# Patient Record
Sex: MALE | Race: WHITE | URBAN - METROPOLITAN AREA
[De-identification: names, ages, dates, MRNs, and addresses within clinical notes are randomized per-mention and may not be internally consistent; named-entity substitution may affect disease eponyms.]

---

## 2017-01-20 ENCOUNTER — Encounter (OUTPATIENT)
Dept: URBAN - METROPOLITAN AREA CLINIC 44 | Facility: CLINIC | Age: 72
End: 2017-01-20
Payer: MEDICARE

## 2017-01-20 DIAGNOSIS — Z01.818 ENCOUNTER FOR OTHER PREPROCEDURAL EXAMINATION: Primary | ICD-10-CM

## 2017-01-20 PROCEDURE — 99213 OFFICE O/P EST LOW 20 MIN: CPT | Performed by: PHYSICIAN ASSISTANT

## 2017-02-03 ENCOUNTER — SURGERY (OUTPATIENT)
Dept: URBAN - METROPOLITAN AREA SURGERY 5 | Facility: SURGERY | Age: 72
End: 2017-02-03
Payer: MEDICARE

## 2017-02-03 PROCEDURE — 15732 MUSCL MYOCUT/FASCIOCUT FLAP; H: CPT | Performed by: OPHTHALMOLOGY

## 2017-02-03 PROCEDURE — 14060 TIS TRNFR E/N/E/L 10 SQ CM/<: CPT | Performed by: OPHTHALMOLOGY

## 2017-02-03 PROCEDURE — 67917 REPAIR EYELID DEFECT: CPT | Performed by: OPHTHALMOLOGY

## 2017-02-03 RX ORDER — NEOMYCIN SULFATE, POLYMYXIN B SULFATE AND DEXAMETHASONE 3.5; 10000; 1 MG/G; [USP'U]/G; MG/G
OINTMENT OPHTHALMIC
Qty: 1 | Refills: 1 | Status: INACTIVE
Start: 2017-02-03 | End: 2017-03-13

## 2017-02-08 ENCOUNTER — POST OP (OUTPATIENT)
Dept: URBAN - METROPOLITAN AREA CLINIC 10 | Facility: CLINIC | Age: 72
End: 2017-02-08

## 2017-02-08 PROCEDURE — 99024 POSTOP FOLLOW-UP VISIT: CPT | Performed by: OPHTHALMOLOGY

## 2017-02-13 ENCOUNTER — POST OP (OUTPATIENT)
Dept: URBAN - METROPOLITAN AREA CLINIC 51 | Facility: CLINIC | Age: 72
End: 2017-02-13

## 2017-02-13 PROCEDURE — 99024 POSTOP FOLLOW-UP VISIT: CPT | Performed by: OPHTHALMOLOGY

## 2017-03-13 ENCOUNTER — POST OP (OUTPATIENT)
Dept: URBAN - METROPOLITAN AREA CLINIC 51 | Facility: CLINIC | Age: 72
End: 2017-03-13
Payer: MEDICARE

## 2017-03-13 PROCEDURE — 99213 OFFICE O/P EST LOW 20 MIN: CPT | Performed by: OPHTHALMOLOGY

## 2017-03-13 PROCEDURE — 92285 EXTERNAL OCULAR PHOTOGRAPHY: CPT | Performed by: OPHTHALMOLOGY

## 2017-03-13 PROCEDURE — 67840 REMOVE EYELID LESION: CPT | Performed by: OPHTHALMOLOGY

## 2017-04-06 ENCOUNTER — FOLLOW UP ESTABLISHED (OUTPATIENT)
Dept: URBAN - METROPOLITAN AREA CLINIC 10 | Facility: CLINIC | Age: 72
End: 2017-04-06
Payer: MEDICARE

## 2017-04-06 ENCOUNTER — RECORDS - HEALTHEAST (OUTPATIENT)
Dept: ADMINISTRATIVE | Facility: OTHER | Age: 72
End: 2017-04-06

## 2017-04-06 DIAGNOSIS — L90.5 SCAR: ICD-10-CM

## 2017-04-06 DIAGNOSIS — L57.0 ACTINIC KERATOSIS: ICD-10-CM

## 2017-04-06 PROCEDURE — 11900 INJECT SKIN LESIONS </W 7: CPT | Performed by: OPHTHALMOLOGY

## 2017-04-06 PROCEDURE — 67810 INCAL BX EYELID SKN LID MRGN: CPT | Performed by: OPHTHALMOLOGY

## 2017-04-06 PROCEDURE — 92285 EXTERNAL OCULAR PHOTOGRAPHY: CPT | Performed by: OPHTHALMOLOGY

## 2017-05-22 ENCOUNTER — RECORDS - HEALTHEAST (OUTPATIENT)
Dept: ADMINISTRATIVE | Facility: OTHER | Age: 72
End: 2017-05-22

## 2017-06-21 ENCOUNTER — RECORDS - HEALTHEAST (OUTPATIENT)
Dept: ADMINISTRATIVE | Facility: OTHER | Age: 72
End: 2017-06-21

## 2017-08-23 ENCOUNTER — OFFICE VISIT - HEALTHEAST (OUTPATIENT)
Dept: INTERNAL MEDICINE | Facility: CLINIC | Age: 72
End: 2017-08-23

## 2017-08-23 DIAGNOSIS — Z00.00 HEALTH CARE MAINTENANCE: ICD-10-CM

## 2017-08-23 DIAGNOSIS — Z51.81 MEDICATION MONITORING ENCOUNTER: ICD-10-CM

## 2017-08-23 DIAGNOSIS — Z85.828 HISTORY OF BASAL CELL CANCER: ICD-10-CM

## 2017-08-23 DIAGNOSIS — Z86.0100 HISTORY OF COLON POLYPS: ICD-10-CM

## 2017-08-23 DIAGNOSIS — Z12.5 PROSTATE CANCER SCREENING: ICD-10-CM

## 2017-08-23 DIAGNOSIS — G47.30 SLEEP APNEA, UNSPECIFIED TYPE: ICD-10-CM

## 2017-08-23 LAB
CHOLEST SERPL-MCNC: 280 MG/DL
FASTING STATUS PATIENT QL REPORTED: YES
HDLC SERPL-MCNC: 78 MG/DL
LDLC SERPL CALC-MCNC: 185 MG/DL
PSA SERPL-MCNC: 0.5 NG/ML (ref 0–6.5)
TRIGL SERPL-MCNC: 83 MG/DL

## 2017-08-23 ASSESSMENT — MIFFLIN-ST. JEOR: SCORE: 1618.39

## 2017-08-24 ENCOUNTER — COMMUNICATION - HEALTHEAST (OUTPATIENT)
Dept: INTERNAL MEDICINE | Facility: CLINIC | Age: 72
End: 2017-08-24

## 2017-09-18 ENCOUNTER — OFFICE VISIT - HEALTHEAST (OUTPATIENT)
Dept: SLEEP MEDICINE | Facility: CLINIC | Age: 72
End: 2017-09-18

## 2017-09-18 DIAGNOSIS — G47.10 HYPERSOMNIA, UNSPECIFIED: ICD-10-CM

## 2017-09-18 DIAGNOSIS — R06.83 SNORING: ICD-10-CM

## 2017-09-18 DIAGNOSIS — G47.33 OSA (OBSTRUCTIVE SLEEP APNEA): ICD-10-CM

## 2017-09-18 ASSESSMENT — MIFFLIN-ST. JEOR: SCORE: 1621.23

## 2017-09-20 ENCOUNTER — COMMUNICATION - HEALTHEAST (OUTPATIENT)
Dept: INTERNAL MEDICINE | Facility: CLINIC | Age: 72
End: 2017-09-20

## 2017-09-20 ENCOUNTER — OFFICE VISIT - HEALTHEAST (OUTPATIENT)
Dept: INTERNAL MEDICINE | Facility: CLINIC | Age: 72
End: 2017-09-20

## 2017-09-20 DIAGNOSIS — N52.9 ERECTILE DYSFUNCTION, UNSPECIFIED ERECTILE DYSFUNCTION TYPE: ICD-10-CM

## 2017-09-20 DIAGNOSIS — G47.30 SLEEP APNEA, UNSPECIFIED TYPE: ICD-10-CM

## 2017-09-20 DIAGNOSIS — E78.00 HYPERCHOLESTEROLEMIA: ICD-10-CM

## 2017-09-20 LAB
CHOLEST SERPL-MCNC: 232 MG/DL
FASTING STATUS PATIENT QL REPORTED: YES
HDLC SERPL-MCNC: 66 MG/DL
LDLC SERPL CALC-MCNC: 154 MG/DL
TRIGL SERPL-MCNC: 59 MG/DL

## 2017-09-20 ASSESSMENT — MIFFLIN-ST. JEOR: SCORE: 1616.69

## 2017-09-22 ENCOUNTER — COMMUNICATION - HEALTHEAST (OUTPATIENT)
Dept: INTERNAL MEDICINE | Facility: CLINIC | Age: 72
End: 2017-09-22

## 2017-10-20 ENCOUNTER — COMMUNICATION - HEALTHEAST (OUTPATIENT)
Dept: SLEEP MEDICINE | Facility: CLINIC | Age: 72
End: 2017-10-20

## 2017-10-23 ENCOUNTER — RECORDS - HEALTHEAST (OUTPATIENT)
Dept: SLEEP MEDICINE | Age: 72
End: 2017-10-23

## 2017-10-23 DIAGNOSIS — G47.10 HYPERSOMNIA, UNSPECIFIED: ICD-10-CM

## 2017-10-23 DIAGNOSIS — G47.33 OBSTRUCTIVE SLEEP APNEA (ADULT) (PEDIATRIC): ICD-10-CM

## 2017-10-23 DIAGNOSIS — R06.83 SNORING: ICD-10-CM

## 2017-10-24 ENCOUNTER — COMMUNICATION - HEALTHEAST (OUTPATIENT)
Dept: SLEEP MEDICINE | Facility: CLINIC | Age: 72
End: 2017-10-24

## 2017-10-24 ENCOUNTER — AMBULATORY - HEALTHEAST (OUTPATIENT)
Dept: SLEEP MEDICINE | Facility: CLINIC | Age: 72
End: 2017-10-24

## 2017-10-24 DIAGNOSIS — G47.33 OSA (OBSTRUCTIVE SLEEP APNEA): ICD-10-CM

## 2017-10-25 ENCOUNTER — COMMUNICATION - HEALTHEAST (OUTPATIENT)
Dept: SLEEP MEDICINE | Facility: CLINIC | Age: 72
End: 2017-10-25

## 2017-12-11 ENCOUNTER — FOLLOW UP ESTABLISHED (OUTPATIENT)
Dept: URBAN - METROPOLITAN AREA CLINIC 51 | Facility: CLINIC | Age: 72
End: 2017-12-11
Payer: MEDICARE

## 2017-12-11 PROCEDURE — 92285 EXTERNAL OCULAR PHOTOGRAPHY: CPT | Performed by: OPHTHALMOLOGY

## 2017-12-11 PROCEDURE — 92012 INTRM OPH EXAM EST PATIENT: CPT | Performed by: OPHTHALMOLOGY

## 2017-12-11 ASSESSMENT — INTRAOCULAR PRESSURE
OS: 14
OD: 16

## 2018-02-05 ENCOUNTER — FOLLOW UP ESTABLISHED (OUTPATIENT)
Dept: URBAN - METROPOLITAN AREA CLINIC 10 | Facility: CLINIC | Age: 73
End: 2018-02-05
Payer: MEDICARE

## 2018-02-05 DIAGNOSIS — Z41.1 ENCOUNTER FOR COSMETIC SURGERY: ICD-10-CM

## 2018-02-05 PROCEDURE — RSTLN COSMETIC RESTYLANE 1ML: CUSTOM | Performed by: OPHTHALMOLOGY

## 2018-02-06 ENCOUNTER — RECORDS - HEALTHEAST (OUTPATIENT)
Dept: ADMINISTRATIVE | Facility: OTHER | Age: 73
End: 2018-02-06

## 2018-02-07 ENCOUNTER — RECORDS - HEALTHEAST (OUTPATIENT)
Dept: ADMINISTRATIVE | Facility: OTHER | Age: 73
End: 2018-02-07

## 2018-02-14 ENCOUNTER — RECORDS - HEALTHEAST (OUTPATIENT)
Dept: ADMINISTRATIVE | Facility: OTHER | Age: 73
End: 2018-02-14

## 2018-03-01 ENCOUNTER — RECORDS - HEALTHEAST (OUTPATIENT)
Dept: ADMINISTRATIVE | Facility: OTHER | Age: 73
End: 2018-03-01

## 2018-05-14 ENCOUNTER — OFFICE VISIT - HEALTHEAST (OUTPATIENT)
Dept: INTERNAL MEDICINE | Facility: CLINIC | Age: 73
End: 2018-05-14

## 2018-05-14 DIAGNOSIS — Z86.0100 HISTORY OF COLON POLYPS: ICD-10-CM

## 2018-05-14 DIAGNOSIS — G47.30 SLEEP APNEA, UNSPECIFIED TYPE: ICD-10-CM

## 2018-05-14 DIAGNOSIS — E78.00 HYPERCHOLESTEROLEMIA: ICD-10-CM

## 2018-05-14 LAB
CHOLEST SERPL-MCNC: 232 MG/DL
FASTING STATUS PATIENT QL REPORTED: YES
HDLC SERPL-MCNC: 71 MG/DL
LDLC SERPL CALC-MCNC: 152 MG/DL
TRIGL SERPL-MCNC: 43 MG/DL

## 2018-05-14 ASSESSMENT — MIFFLIN-ST. JEOR: SCORE: 1575.87

## 2018-05-15 ENCOUNTER — COMMUNICATION - HEALTHEAST (OUTPATIENT)
Dept: INTERNAL MEDICINE | Facility: CLINIC | Age: 73
End: 2018-05-15

## 2018-05-31 ENCOUNTER — COMMUNICATION - HEALTHEAST (OUTPATIENT)
Dept: INTERNAL MEDICINE | Facility: CLINIC | Age: 73
End: 2018-05-31

## 2018-05-31 DIAGNOSIS — E78.00 HYPERCHOLESTEROLEMIA: ICD-10-CM

## 2018-05-31 DIAGNOSIS — Z82.49 FAMILY HISTORY OF CORONARY ARTERY DISEASE: ICD-10-CM

## 2018-06-08 ENCOUNTER — COMMUNICATION - HEALTHEAST (OUTPATIENT)
Dept: INTERNAL MEDICINE | Facility: CLINIC | Age: 73
End: 2018-06-08

## 2018-06-08 DIAGNOSIS — E78.00 HYPERCHOLESTEROLEMIA: ICD-10-CM

## 2018-06-11 ENCOUNTER — COMMUNICATION - HEALTHEAST (OUTPATIENT)
Dept: INTERNAL MEDICINE | Facility: CLINIC | Age: 73
End: 2018-06-11

## 2018-06-11 ENCOUNTER — AMBULATORY - HEALTHEAST (OUTPATIENT)
Dept: INTERNAL MEDICINE | Facility: CLINIC | Age: 73
End: 2018-06-11

## 2018-06-11 DIAGNOSIS — M25.519 SHOULDER PAIN, UNSPECIFIED CHRONICITY, UNSPECIFIED LATERALITY: ICD-10-CM

## 2018-06-11 DIAGNOSIS — M54.50 LOWER BACK PAIN: ICD-10-CM

## 2018-06-26 ENCOUNTER — HOSPITAL ENCOUNTER (OUTPATIENT)
Dept: CT IMAGING | Facility: CLINIC | Age: 73
Discharge: HOME | End: 2018-06-26
Attending: INTERNAL MEDICINE

## 2018-06-26 ENCOUNTER — COMMUNICATION - HEALTHEAST (OUTPATIENT)
Dept: INTERNAL MEDICINE | Facility: CLINIC | Age: 73
End: 2018-06-26

## 2018-06-26 DIAGNOSIS — E78.00 HYPERCHOLESTEREMIA: ICD-10-CM

## 2018-06-26 LAB
CV CALCIUM SCORE AGATSTON LM: 0
CV CALCIUM SCORING AGATSON LAD: 44
CV CALCIUM SCORING AGATSTON CX: 0
CV CALCIUM SCORING AGATSTON RCA: 0
CV CALCIUM SCORING AGATSTON TOTAL: 44

## 2018-06-28 ENCOUNTER — COMMUNICATION - HEALTHEAST (OUTPATIENT)
Dept: INTERNAL MEDICINE | Facility: CLINIC | Age: 73
End: 2018-06-28

## 2018-07-02 ENCOUNTER — COMMUNICATION - HEALTHEAST (OUTPATIENT)
Dept: INTERNAL MEDICINE | Facility: CLINIC | Age: 73
End: 2018-07-02

## 2018-09-04 ENCOUNTER — OFFICE VISIT - HEALTHEAST (OUTPATIENT)
Dept: INTERNAL MEDICINE | Facility: CLINIC | Age: 73
End: 2018-09-04

## 2018-09-04 ENCOUNTER — COMMUNICATION - HEALTHEAST (OUTPATIENT)
Dept: INTERNAL MEDICINE | Facility: CLINIC | Age: 73
End: 2018-09-04

## 2018-09-04 DIAGNOSIS — Z00.00 ROUTINE GENERAL MEDICAL EXAMINATION AT A HEALTH CARE FACILITY: ICD-10-CM

## 2018-09-04 DIAGNOSIS — E78.00 HYPERCHOLESTEROLEMIA: ICD-10-CM

## 2018-09-04 DIAGNOSIS — N52.9 ERECTILE DYSFUNCTION, UNSPECIFIED ERECTILE DYSFUNCTION TYPE: ICD-10-CM

## 2018-09-04 DIAGNOSIS — Z51.81 MEDICATION MONITORING ENCOUNTER: ICD-10-CM

## 2018-09-04 DIAGNOSIS — G47.30 SLEEP APNEA, UNSPECIFIED TYPE: ICD-10-CM

## 2018-09-04 DIAGNOSIS — Z12.5 SCREENING FOR PROSTATE CANCER: ICD-10-CM

## 2018-09-04 DIAGNOSIS — Z00.00 HEALTH CARE MAINTENANCE: ICD-10-CM

## 2018-09-04 DIAGNOSIS — Z86.0100 HISTORY OF COLON POLYPS: ICD-10-CM

## 2018-09-04 LAB
ALBUMIN SERPL-MCNC: 4 G/DL (ref 3.5–5)
ALP SERPL-CCNC: 67 U/L (ref 45–120)
ALT SERPL W P-5'-P-CCNC: 22 U/L (ref 0–45)
ANION GAP SERPL CALCULATED.3IONS-SCNC: 9 MMOL/L (ref 5–18)
AST SERPL W P-5'-P-CCNC: 25 U/L (ref 0–40)
BILIRUB SERPL-MCNC: 1.4 MG/DL (ref 0–1)
BUN SERPL-MCNC: 13 MG/DL (ref 8–28)
CALCIUM SERPL-MCNC: 9.4 MG/DL (ref 8.5–10.5)
CHLORIDE BLD-SCNC: 107 MMOL/L (ref 98–107)
CHOLEST SERPL-MCNC: 186 MG/DL
CK SERPL-CCNC: 108 U/L (ref 30–190)
CO2 SERPL-SCNC: 25 MMOL/L (ref 22–31)
CREAT SERPL-MCNC: 0.8 MG/DL (ref 0.7–1.3)
ERYTHROCYTE [DISTWIDTH] IN BLOOD BY AUTOMATED COUNT: 10.8 % (ref 11–14.5)
FASTING STATUS PATIENT QL REPORTED: YES
GFR SERPL CREATININE-BSD FRML MDRD: >60 ML/MIN/1.73M2
GLUCOSE BLD-MCNC: 97 MG/DL (ref 70–125)
HCT VFR BLD AUTO: 41.6 % (ref 40–54)
HDLC SERPL-MCNC: 71 MG/DL
HGB BLD-MCNC: 14.3 G/DL (ref 14–18)
LDLC SERPL CALC-MCNC: 106 MG/DL
MCH RBC QN AUTO: 32.3 PG (ref 27–34)
MCHC RBC AUTO-ENTMCNC: 34.3 G/DL (ref 32–36)
MCV RBC AUTO: 94 FL (ref 80–100)
PLATELET # BLD AUTO: 159 THOU/UL (ref 140–440)
PMV BLD AUTO: 7.8 FL (ref 7–10)
POTASSIUM BLD-SCNC: 4.4 MMOL/L (ref 3.5–5)
PROT SERPL-MCNC: 7.2 G/DL (ref 6–8)
PSA SERPL-MCNC: 0.4 NG/ML (ref 0–6.5)
RBC # BLD AUTO: 4.42 MILL/UL (ref 4.4–6.2)
SODIUM SERPL-SCNC: 141 MMOL/L (ref 136–145)
TRIGL SERPL-MCNC: 43 MG/DL
WBC: 4.5 THOU/UL (ref 4–11)

## 2018-09-04 ASSESSMENT — MIFFLIN-ST. JEOR: SCORE: 1569.07

## 2018-09-07 ENCOUNTER — COMMUNICATION - HEALTHEAST (OUTPATIENT)
Dept: INTERNAL MEDICINE | Facility: CLINIC | Age: 73
End: 2018-09-07

## 2018-11-14 ENCOUNTER — RECORDS - HEALTHEAST (OUTPATIENT)
Dept: ADMINISTRATIVE | Facility: OTHER | Age: 73
End: 2018-11-14

## 2019-05-13 ENCOUNTER — COMMUNICATION - HEALTHEAST (OUTPATIENT)
Dept: INTERNAL MEDICINE | Facility: CLINIC | Age: 74
End: 2019-05-13

## 2019-05-13 ENCOUNTER — AMBULATORY - HEALTHEAST (OUTPATIENT)
Dept: LAB | Facility: CLINIC | Age: 74
End: 2019-05-13

## 2019-05-13 DIAGNOSIS — E78.00 HYPERCHOLESTEROLEMIA: ICD-10-CM

## 2019-05-13 DIAGNOSIS — Z51.81 MEDICATION MONITORING ENCOUNTER: ICD-10-CM

## 2019-05-13 LAB
ALBUMIN SERPL-MCNC: 4.1 G/DL (ref 3.5–5)
ALP SERPL-CCNC: 77 U/L (ref 45–120)
ALT SERPL W P-5'-P-CCNC: 27 U/L (ref 0–45)
AST SERPL W P-5'-P-CCNC: 27 U/L (ref 0–40)
BILIRUB DIRECT SERPL-MCNC: 0.5 MG/DL
BILIRUB SERPL-MCNC: 1.3 MG/DL (ref 0–1)
CHOLEST SERPL-MCNC: 164 MG/DL
FASTING STATUS PATIENT QL REPORTED: YES
HDLC SERPL-MCNC: 72 MG/DL
LDLC SERPL CALC-MCNC: 83 MG/DL
PROT SERPL-MCNC: 7 G/DL (ref 6–8)
TRIGL SERPL-MCNC: 46 MG/DL

## 2019-05-14 ENCOUNTER — COMMUNICATION - HEALTHEAST (OUTPATIENT)
Dept: INTERNAL MEDICINE | Facility: CLINIC | Age: 74
End: 2019-05-14

## 2019-08-30 ENCOUNTER — COMMUNICATION - HEALTHEAST (OUTPATIENT)
Dept: INTERNAL MEDICINE | Facility: CLINIC | Age: 74
End: 2019-08-30

## 2019-08-30 DIAGNOSIS — E78.00 HYPERCHOLESTEROLEMIA: ICD-10-CM

## 2019-09-20 ENCOUNTER — OFFICE VISIT - HEALTHEAST (OUTPATIENT)
Dept: INTERNAL MEDICINE | Facility: CLINIC | Age: 74
End: 2019-09-20

## 2019-09-20 DIAGNOSIS — G47.30 SLEEP APNEA, UNSPECIFIED TYPE: ICD-10-CM

## 2019-09-20 DIAGNOSIS — Z12.5 ENCOUNTER FOR SCREENING FOR MALIGNANT NEOPLASM OF PROSTATE: ICD-10-CM

## 2019-09-20 DIAGNOSIS — Z12.11 SCREEN FOR COLON CANCER: ICD-10-CM

## 2019-09-20 DIAGNOSIS — E66.09 OTHER OBESITY DUE TO EXCESS CALORIES: ICD-10-CM

## 2019-09-20 DIAGNOSIS — N52.9 ERECTILE DYSFUNCTION, UNSPECIFIED ERECTILE DYSFUNCTION TYPE: ICD-10-CM

## 2019-09-20 DIAGNOSIS — Z00.00 ENCOUNTER FOR MEDICARE ANNUAL WELLNESS EXAM: ICD-10-CM

## 2019-09-20 DIAGNOSIS — Z00.00 HEALTHCARE MAINTENANCE: ICD-10-CM

## 2019-09-20 DIAGNOSIS — Z86.0100 HISTORY OF COLON POLYPS: ICD-10-CM

## 2019-09-20 DIAGNOSIS — E78.00 HYPERCHOLESTEROLEMIA: ICD-10-CM

## 2019-09-20 LAB
ALBUMIN SERPL-MCNC: 4.3 G/DL (ref 3.5–5)
ALP SERPL-CCNC: 85 U/L (ref 45–120)
ALT SERPL W P-5'-P-CCNC: 26 U/L (ref 0–45)
ANION GAP SERPL CALCULATED.3IONS-SCNC: 11 MMOL/L (ref 5–18)
AST SERPL W P-5'-P-CCNC: 28 U/L (ref 0–40)
BILIRUB SERPL-MCNC: 1.5 MG/DL (ref 0–1)
BUN SERPL-MCNC: 17 MG/DL (ref 8–28)
CALCIUM SERPL-MCNC: 9.8 MG/DL (ref 8.5–10.5)
CHLORIDE BLD-SCNC: 104 MMOL/L (ref 98–107)
CHOLEST SERPL-MCNC: 156 MG/DL
CO2 SERPL-SCNC: 25 MMOL/L (ref 22–31)
CREAT SERPL-MCNC: 0.99 MG/DL (ref 0.7–1.3)
ERYTHROCYTE [DISTWIDTH] IN BLOOD BY AUTOMATED COUNT: 10.6 % (ref 11–14.5)
FASTING STATUS PATIENT QL REPORTED: YES
GFR SERPL CREATININE-BSD FRML MDRD: >60 ML/MIN/1.73M2
GLUCOSE BLD-MCNC: 92 MG/DL (ref 70–125)
HCT VFR BLD AUTO: 46 % (ref 40–54)
HDLC SERPL-MCNC: 70 MG/DL
HGB BLD-MCNC: 15.7 G/DL (ref 14–18)
LDLC SERPL CALC-MCNC: 75 MG/DL
MCH RBC QN AUTO: 33.1 PG (ref 27–34)
MCHC RBC AUTO-ENTMCNC: 34.2 G/DL (ref 32–36)
MCV RBC AUTO: 97 FL (ref 80–100)
PLATELET # BLD AUTO: 184 THOU/UL (ref 140–440)
PMV BLD AUTO: 7.7 FL (ref 7–10)
POTASSIUM BLD-SCNC: 4.5 MMOL/L (ref 3.5–5)
PROT SERPL-MCNC: 7.5 G/DL (ref 6–8)
PSA SERPL-MCNC: 0.4 NG/ML (ref 0–6.5)
RBC # BLD AUTO: 4.75 MILL/UL (ref 4.4–6.2)
SODIUM SERPL-SCNC: 140 MMOL/L (ref 136–145)
TRIGL SERPL-MCNC: 54 MG/DL
WBC: 5.2 THOU/UL (ref 4–11)

## 2019-09-20 RX ORDER — SILDENAFIL CITRATE 20 MG/1
60 TABLET ORAL DAILY PRN
Qty: 90 TABLET | Refills: 3 | Status: SHIPPED | OUTPATIENT
Start: 2019-09-20 | End: 2023-10-09

## 2019-09-20 ASSESSMENT — PATIENT HEALTH QUESTIONNAIRE - PHQ9: SUM OF ALL RESPONSES TO PHQ QUESTIONS 1-9: 0

## 2019-09-20 ASSESSMENT — MIFFLIN-ST. JEOR: SCORE: 1554.1

## 2019-09-23 ENCOUNTER — COMMUNICATION - HEALTHEAST (OUTPATIENT)
Dept: INTERNAL MEDICINE | Facility: CLINIC | Age: 74
End: 2019-09-23

## 2019-09-23 LAB
25(OH)D3 SERPL-MCNC: 37.1 NG/ML (ref 30–80)
25(OH)D3 SERPL-MCNC: 37.1 NG/ML (ref 30–80)

## 2019-09-24 ENCOUNTER — TRANSFERRED RECORDS (OUTPATIENT)
Dept: HEALTH INFORMATION MANAGEMENT | Facility: CLINIC | Age: 74
End: 2019-09-24

## 2019-12-03 ENCOUNTER — FOLLOW UP ESTABLISHED (OUTPATIENT)
Dept: URBAN - METROPOLITAN AREA CLINIC 51 | Facility: CLINIC | Age: 74
End: 2019-12-03
Payer: MEDICARE

## 2019-12-03 DIAGNOSIS — H02.055 TRICHIASIS WITHOUT ENTROPION, LEFT LOWER LID: Primary | ICD-10-CM

## 2019-12-03 DIAGNOSIS — D48.1 NEOPLASM OF UNCERTAIN BEHAVIOR OF CONNCTV/SOFT TISS: ICD-10-CM

## 2019-12-03 PROCEDURE — 99214 OFFICE O/P EST MOD 30 MIN: CPT | Performed by: OPHTHALMOLOGY

## 2019-12-03 PROCEDURE — 67820 REVISE EYELASHES: CPT | Performed by: OPHTHALMOLOGY

## 2019-12-03 PROCEDURE — 92285 EXTERNAL OCULAR PHOTOGRAPHY: CPT | Performed by: OPHTHALMOLOGY

## 2020-09-17 ENCOUNTER — RECORDS - HEALTHEAST (OUTPATIENT)
Dept: ADMINISTRATIVE | Facility: OTHER | Age: 75
End: 2020-09-17

## 2020-09-21 ENCOUNTER — OFFICE VISIT - HEALTHEAST (OUTPATIENT)
Dept: FAMILY MEDICINE | Facility: CLINIC | Age: 75
End: 2020-09-21

## 2020-09-21 DIAGNOSIS — Z12.5 ENCOUNTER FOR SCREENING FOR MALIGNANT NEOPLASM OF PROSTATE: ICD-10-CM

## 2020-09-21 DIAGNOSIS — E78.00 HYPERCHOLESTEROLEMIA: ICD-10-CM

## 2020-09-21 DIAGNOSIS — Z86.0100 HISTORY OF COLON POLYPS: ICD-10-CM

## 2020-09-21 DIAGNOSIS — G47.30 SLEEP APNEA, UNSPECIFIED TYPE: ICD-10-CM

## 2020-09-21 DIAGNOSIS — Z00.00 ENCOUNTER FOR MEDICARE ANNUAL WELLNESS EXAM: ICD-10-CM

## 2020-09-21 DIAGNOSIS — Z00.00 HEALTHCARE MAINTENANCE: ICD-10-CM

## 2020-09-21 LAB
ERYTHROCYTE [DISTWIDTH] IN BLOOD BY AUTOMATED COUNT: 11 % (ref 11–14.5)
HCT VFR BLD AUTO: 46.4 % (ref 40–54)
HGB BLD-MCNC: 15.3 G/DL (ref 14–18)
MCH RBC QN AUTO: 32.2 PG (ref 27–34)
MCHC RBC AUTO-ENTMCNC: 33 G/DL (ref 32–36)
MCV RBC AUTO: 97 FL (ref 80–100)
PLATELET # BLD AUTO: 156 THOU/UL (ref 140–440)
PMV BLD AUTO: 8.2 FL (ref 7–10)
RBC # BLD AUTO: 4.76 MILL/UL (ref 4.4–6.2)
WBC: 4.5 THOU/UL (ref 4–11)

## 2020-09-21 ASSESSMENT — MIFFLIN-ST. JEOR: SCORE: 1529.38

## 2020-09-22 ENCOUNTER — COMMUNICATION - HEALTHEAST (OUTPATIENT)
Dept: FAMILY MEDICINE | Facility: CLINIC | Age: 75
End: 2020-09-22

## 2020-09-24 ENCOUNTER — RECORDS - HEALTHEAST (OUTPATIENT)
Dept: ADMINISTRATIVE | Facility: OTHER | Age: 75
End: 2020-09-24

## 2020-09-25 ENCOUNTER — COMMUNICATION - HEALTHEAST (OUTPATIENT)
Dept: INTERNAL MEDICINE | Facility: CLINIC | Age: 75
End: 2020-09-25

## 2020-09-28 ENCOUNTER — COMMUNICATION - HEALTHEAST (OUTPATIENT)
Dept: INTERNAL MEDICINE | Facility: CLINIC | Age: 75
End: 2020-09-28

## 2020-09-28 ENCOUNTER — OFFICE VISIT - HEALTHEAST (OUTPATIENT)
Dept: FAMILY MEDICINE | Facility: CLINIC | Age: 75
End: 2020-09-28

## 2020-09-28 DIAGNOSIS — M25.511 RIGHT SHOULDER PAIN, UNSPECIFIED CHRONICITY: ICD-10-CM

## 2020-09-28 LAB — ERYTHROCYTE [SEDIMENTATION RATE] IN BLOOD BY WESTERGREN METHOD: 4 MM/HR (ref 0–15)

## 2020-10-05 LAB
ALBUMIN SERPL-MCNC: 4.3 G/DL (ref 3.5–5)
ALP SERPL-CCNC: 76 U/L (ref 45–120)
ALT SERPL W P-5'-P-CCNC: 27 U/L (ref 0–45)
ANION GAP SERPL CALCULATED.3IONS-SCNC: 10 MMOL/L (ref 5–18)
AST SERPL W P-5'-P-CCNC: 30 U/L (ref 0–40)
BILIRUB SERPL-MCNC: 2.2 MG/DL (ref 0–1)
BUN SERPL-MCNC: 16 MG/DL (ref 8–28)
CALCIUM SERPL-MCNC: 9.3 MG/DL (ref 8.5–10.5)
CHLORIDE BLD-SCNC: 105 MMOL/L (ref 98–107)
CHOLEST SERPL-MCNC: 195 MG/DL
CO2 SERPL-SCNC: 25 MMOL/L (ref 22–31)
CREAT SERPL-MCNC: 0.8 MG/DL (ref 0.7–1.3)
FASTING STATUS PATIENT QL REPORTED: YES
GFR SERPL CREATININE-BSD FRML MDRD: >60 ML/MIN/1.73M2
GLUCOSE BLD-MCNC: 78 MG/DL (ref 70–125)
HDLC SERPL-MCNC: 90 MG/DL
LDLC SERPL CALC-MCNC: 94 MG/DL
POTASSIUM BLD-SCNC: 4.5 MMOL/L (ref 3.5–5)
PROT SERPL-MCNC: 7.3 G/DL (ref 6–8)
PSA SERPL-MCNC: 0.4 NG/ML (ref 0–6.5)
SODIUM SERPL-SCNC: 140 MMOL/L (ref 136–145)
TRIGL SERPL-MCNC: 53 MG/DL

## 2020-11-09 ENCOUNTER — COMMUNICATION - HEALTHEAST (OUTPATIENT)
Dept: INTERNAL MEDICINE | Facility: CLINIC | Age: 75
End: 2020-11-09

## 2020-11-09 DIAGNOSIS — E78.00 HYPERCHOLESTEROLEMIA: ICD-10-CM

## 2020-11-10 RX ORDER — ATORVASTATIN CALCIUM 10 MG/1
10 TABLET, FILM COATED ORAL DAILY
Qty: 90 TABLET | Refills: 3 | Status: SHIPPED | OUTPATIENT
Start: 2020-11-10 | End: 2021-11-02

## 2021-05-03 ENCOUNTER — COMMUNICATION - HEALTHEAST (OUTPATIENT)
Dept: PEDIATRICS | Facility: CLINIC | Age: 76
End: 2021-05-03

## 2021-05-03 DIAGNOSIS — L98.9 SKIN LESION: ICD-10-CM

## 2021-05-26 ASSESSMENT — PATIENT HEALTH QUESTIONNAIRE - PHQ9: SUM OF ALL RESPONSES TO PHQ QUESTIONS 1-9: 0

## 2021-05-31 VITALS — WEIGHT: 193 LBS | HEIGHT: 70 IN | BODY MASS INDEX: 27.63 KG/M2

## 2021-05-31 VITALS — WEIGHT: 194 LBS | HEIGHT: 70 IN | BODY MASS INDEX: 27.77 KG/M2

## 2021-05-31 VITALS — WEIGHT: 196 LBS | HEIGHT: 69 IN | BODY MASS INDEX: 29.03 KG/M2

## 2021-05-31 NOTE — TELEPHONE ENCOUNTER
Refill Approved    Rx renewed per Medication Renewal Policy. Medication was last renewed on 9/4/2018.    Pt had labs drawn in May as ordered and letter sent to patient as this was a new medication for patient.    Jennifer Wynne, [facility] [place] Connection Triage/Med Refill 8/30/2019     Requested Prescriptions   Pending Prescriptions Disp Refills     atorvastatin (LIPITOR) 10 MG tablet [Pharmacy Med Name: ATORVASTATIN 10 MG TABLET] 90 tablet 0     Sig: TAKE 1 TABLET BY MOUTH EVERY DAY       Statins Refill Protocol (Hmg CoA Reductase Inhibitors) Passed - 8/30/2019 12:40 PM        Passed - PCP or prescribing provider visit in past 12 months      Last office visit with prescriber/PCP: 5/14/2018 Herberth Duffy MD OR same dept: Visit date not found OR same specialty: 5/14/2018 Herberth Duffy MD  Last physical: 9/4/2018 Last MTM visit: Visit date not found   Next visit within 3 mo: Visit date not found  Next physical within 3 mo: Visit date not found  Prescriber OR PCP: Herberth Duffy MD  Last diagnosis associated with med order: 1. Hypercholesterolemia  - atorvastatin (LIPITOR) 10 MG tablet [Pharmacy Med Name: ATORVASTATIN 10 MG TABLET]; TAKE 1 TABLET BY MOUTH EVERY DAY  Dispense: 90 tablet; Refill: 0    If protocol passes may refill for 12 months if within 3 months of last provider visit (or a total of 15 months).

## 2021-05-31 NOTE — TELEPHONE ENCOUNTER
Patient is going out of country tomorrow requesting to refill as soon as possible.  Refill Request  Did you contact pharmacy: No  Medication name:   Requested Prescriptions     Pending Prescriptions Disp Refills     atorvastatin (LIPITOR) 10 MG tablet [Pharmacy Med Name: ATORVASTATIN 10 MG TABLET] 90 tablet 0     Sig: TAKE 1 TABLET BY MOUTH EVERY DAY     Who prescribed the medication: 9/4/18  Pharmacy Name and Location: [facility] # [id number]  Is patient out of medication: Yes  Patient notified refills processed in 72 hours:  yes  Okay to leave a detailed message: no

## 2021-06-01 VITALS — BODY MASS INDEX: 26.34 KG/M2 | HEIGHT: 70 IN | WEIGHT: 184 LBS

## 2021-06-01 NOTE — PROGRESS NOTES
Assessment and Plan:       1. Encounter for Medicare annual wellness exam  Generally healthy 74-year-old  male.  - Comprehensive Metabolic Panel  - HM2(CBC w/o Differential)  - Vitamin D, Total (25-Hydroxy)    2. Healthcare maintenance  He has his colonoscopy scheduled for 2 weeks out.  Up-to-date on immunizations.  He would like his PSA rechecked today.  He would also like his vitamin D levels checked.  - Comprehensive Metabolic Panel  - HM2(CBC w/o Differential)  - Lipid Profile  - PSA (Prostatic-Specific Antigen), Annual Screen  - Vitamin D, Total (25-Hydroxy)    3. Sleep apnea, unspecified type  Compliant with his CPAP machine  - HM2(CBC w/o Differential)  - Vitamin D, Total (25-Hydroxy)    4. Hypercholesterolemia  He was switched to atorvastatin 10 mg last year, this is been working well for him.  He is fasting today and would like his labs rechecked  - Comprehensive Metabolic Panel  - atorvastatin (LIPITOR) 10 MG tablet; Take 1 tablet (10 mg total) by mouth daily.  Dispense: 90 tablet; Refill: 3  - Vitamin D, Total (25-Hydroxy)    5. History of colon polyps  He has his colonoscopy scheduled for this month    6. Erectile dysfunction, unspecified erectile dysfunction type  He had tried Viagra in the past but this was expensive.  He thought he would like refills of her body O today.  - sildenafil (REVATIO) 20 mg tablet; Take 3 tablets (60 mg total) by mouth daily as needed.  Dispense: 90 tablet; Refill: 3  - Vitamin D, Total (25-Hydroxy)    7. Encounter for screening for malignant neoplasm of prostate   He wants PSA rechecked today  - PSA (Prostatic-Specific Antigen), Annual Screen    8. Other obesity due to excess calories   BMI is adequate at 26.  He would like vitamin D levels checked today  - Vitamin D, Total (25-Hydroxy)      The patient's current medical problems were reviewed.    I have had an Advance Directives discussion with the patient.  The following health maintenance schedule was reviewed  with the patient and provided in printed form in the after visit summary:   Health Maintenance   Topic Date Due     HEPATITIS C SCREENING  1945     ZOSTER VACCINES (2 of 3) 07/02/2012     INFLUENZA VACCINE RULE BASED (1) 08/01/2019     FALL RISK ASSESSMENT  09/04/2019     COLONOSCOPY  09/14/2019     MEDICARE ANNUAL WELLNESS VISIT  09/04/2019     ADVANCE CARE PLANNING  09/04/2023     TD 18+ HE  06/30/2026     PNEUMOCOCCAL POLYSACCHARIDE VACCINE AGE 65 AND OVER  Completed     PNEUMOCOCCAL CONJUGATE VACCINE FOR ADULTS (PCV13 OR PREVNAR)  Completed        Subjective:   Chief Complaint: Niranjan Dodge is an 74 y.o. male here for an Annual Wellness visit.     HPI: Patient is a retired .  No problems with anxiety or depression.  He likes to walk daily.  He recently traveled to [place] and enjoyed his visit.  Echo was to the dentist every 6 months.  No new hearing or vision changes.    Review of Systems:   Please see above.  The rest of the review of systems are negative for all systems.    Patient Care Team:  Herberth Duffy MD as PCP - General  Herberth Duffy MD as Assigned PCP     Patient Active Problem List   Diagnosis     External Hemorrhoids     Ptosis Of Eyelid     Sleep Apnea     History of colon polyps     Hypercholesterolemia     No past medical history on file.   No past surgical history on file.   Family History   Problem Relation Age of Onset     Sleep apnea Father      Snoring Father       Social History     Socioeconomic History     Marital status: Single     Spouse name: Not on file     Number of children: Not on file     Years of education: Not on file     Highest education level: Not on file   Occupational History     Not on file   Social Needs     Financial resource strain: Not on file     Food insecurity:     Worry: Not on file     Inability: Not on file     Transportation needs:     Medical: Not on file     Non-medical: Not on file   Tobacco Use     Smoking status: Never Smoker  "    Smokeless tobacco: Never Used   Substance and Sexual Activity     Alcohol use: Not on file     Drug use: Not on file     Sexual activity: Not on file   Lifestyle     Physical activity:     Days per week: Not on file     Minutes per session: Not on file     Stress: Not on file   Relationships     Social connections:     Talks on phone: Not on file     Gets together: Not on file     Attends Yarsani service: Not on file     Active member of club or organization: Not on file     Attends meetings of clubs or organizations: Not on file     Relationship status: Not on file     Intimate partner violence:     Fear of current or ex partner: Not on file     Emotionally abused: Not on file     Physically abused: Not on file     Forced sexual activity: Not on file   Other Topics Concern     Not on file   Social History Narrative     Not on file      Current Outpatient Medications   Medication Sig Dispense Refill     aspirin 81 mg chewable tablet Chew 81 mg daily.       sildenafil (REVATIO) 20 mg tablet Take 3 tablets (60 mg total) by mouth daily as needed. 90 tablet 3     atorvastatin (LIPITOR) 10 MG tablet Take 1 tablet (10 mg total) by mouth daily. 90 tablet 3     No current facility-administered medications for this visit.       Objective:   Vital Signs:   Visit Vitals  /78 (Patient Site: Right Arm, Patient Position: Sitting, Cuff Size: Adult Large)   Pulse (!) 56   Ht 5' 9\" (1.753 m)   Wt 182 lb 11.2 oz (82.9 kg)   SpO2 97%   BMI 26.98 kg/m         VisionScreening:  No exam data present     PHYSICAL EXAM  General: No apparent distress. Calm. Alert and Oriented X3. Pt behavior is appropriate.  Head:Atraumatic. Normocephalic, non-tender to palpation  Neck: Supple. No JVD. Full ROM.   Eyes: PERRL, No discharge. No strabismus. No nystagmus.  Ears: TMs pearly gray with landmarks visible.   Nose/Mouth/Throat: Patent nares, no oral lesions, pharynx clear and without exudate. Uvula mid-line. Nasal septum mid-line. Clear " turbinates.   Lymph: No axillar or cervical adenopathy.   Chest/Lungs: Normal chest wall, clear to auscultation, normal respiratory effort and rate.   Heart/Pulses: Regular rate and rhythm, strong and equal radial pulses, no murmurs. Capillary refill <2 seconds. No edema.   Abdomen: Soft, no palpable masses. No hepatosplenomegaly, no tenderness with palpation noted. Bowel sounds active in all quadrants. No increased tympany.   Genitalia: Not examined.  Patient declined  Musculoskeletal: No CVA tenderness with palpation. Good ROM with extremities.   Neurologic: Interactive, alert, no focal findings, CNs intact.   Skin: Warm, dry. Normal hair pattern. Free of lesions. Normal skin turgor.         Assessment Results 9/20/2019   Activities of Daily Living No help needed   Instrumental Activities of Daily Living No help needed   Get Up and Go Score -   Mini Cog Total Score 3   Some recent data might be hidden     A Mini-Cog score of 0-2 suggests the possibility of dementia, score of 3-5 suggests no dementia    Identified Health Risks:     Information on urinary incontinence and treatment options given to patient.  Patient's advanced directive was discussed and I am comfortable with the patient's wishes.

## 2021-06-02 VITALS — BODY MASS INDEX: 27.55 KG/M2 | HEIGHT: 69 IN | WEIGHT: 186 LBS

## 2021-06-03 VITALS
WEIGHT: 182.7 LBS | HEART RATE: 56 BPM | OXYGEN SATURATION: 97 % | DIASTOLIC BLOOD PRESSURE: 78 MMHG | SYSTOLIC BLOOD PRESSURE: 118 MMHG | HEIGHT: 69 IN | BODY MASS INDEX: 27.06 KG/M2

## 2021-06-05 VITALS
HEART RATE: 54 BPM | BODY MASS INDEX: 26.82 KG/M2 | WEIGHT: 179 LBS | DIASTOLIC BLOOD PRESSURE: 66 MMHG | OXYGEN SATURATION: 100 % | SYSTOLIC BLOOD PRESSURE: 126 MMHG

## 2021-06-05 VITALS
DIASTOLIC BLOOD PRESSURE: 66 MMHG | OXYGEN SATURATION: 99 % | HEIGHT: 69 IN | SYSTOLIC BLOOD PRESSURE: 115 MMHG | WEIGHT: 179 LBS | HEART RATE: 54 BPM | BODY MASS INDEX: 26.51 KG/M2

## 2021-06-11 NOTE — PROGRESS NOTES
Assessment and Plan:     Patient has been advised of split billing requirements and indicates understanding: Yes     1. Encounter for Medicare annual wellness exam  Chronic medical issues discussed today.    2. Healthcare maintenance  He would like to recheck a PSA today.  Colonoscopy due in 2024.  Recheck fasting lipid cholesterol panel today.  He already had his flu shot this year.      3. Sleep apnea  Compliant with CPAP therapy    4. Hypercholesterolemia  On atorvastatin 10 mg daily.  No jaundice or right upper quadrant tenderness.  He is fasting today and will have his labs rechecked    5. History of colon polyps  No blood in stool or melena.  Colonoscopy due in 2024    6. Encounter for screening for malignant neoplasm of prostate   - PSA (Prostatic-Specific Antigen), Annual Screen        The patient's current medical problems were reviewed.    I have had an Advance Directives discussion with the patient.  The following health maintenance schedule was reviewed with the patient and provided in printed form in the after visit summary:   Health Maintenance   Topic Date Due     HEPATITIS C SCREENING  1945     ZOSTER VACCINES (2 of 3) 07/02/2012     INFLUENZA VACCINE RULE BASED (1) 08/01/2020     FALL RISK ASSESSMENT  09/20/2020     MEDICARE ANNUAL WELLNESS VISIT  09/20/2020     LIPID  09/20/2024     ADVANCE CARE PLANNING  09/20/2024     TD 18+ HE  06/30/2026     COLORECTAL CANCER SCREENING  09/24/2029     PNEUMOCOCCAL IMMUNIZATION 65+ LOW/MEDIUM RISK  Completed     HEPATITIS B VACCINES  Aged Out        Subjective:   Chief Complaint: Niranjan Dodge is an 75 y.o. male here for an Annual Wellness visit.   HPI: He has noticed an uptake in his alcohol consumption with a COVID-19 pandemic, now consuming between 3 and 4 alcoholic beverages per day.  His mood is stable.  Regular visits to the dentist.  He considers himself to be an active person.  Weight is been stable.  He has been dealing with some neck  issues lately and is following with an orthopedic specialist up in [place].    Review of Systems:    Please see above.  The rest of the review of systems are negative for all systems.    Patient Care Team:  Herberth Duffy MD as PCP - General  Herberth Duffy MD as Assigned PCP     Patient Active Problem List   Diagnosis     External Hemorrhoids     Ptosis Of Eyelid     Sleep Apnea     History of colon polyps     Hypercholesterolemia     No past medical history on file.   No past surgical history on file.   Family History   Problem Relation Age of Onset     Sleep apnea Father      Snoring Father       Social History     Socioeconomic History     Marital status: Single     Spouse name: Not on file     Number of children: Not on file     Years of education: Not on file     Highest education level: Not on file   Occupational History     Not on file   Social Needs     Financial resource strain: Not on file     Food insecurity     Worry: Not on file     Inability: Not on file     Transportation needs     Medical: Not on file     Non-medical: Not on file   Tobacco Use     Smoking status: Never Smoker     Smokeless tobacco: Never Used   Substance and Sexual Activity     Alcohol use: Not on file     Drug use: Not on file     Sexual activity: Not on file   Lifestyle     Physical activity     Days per week: Not on file     Minutes per session: Not on file     Stress: Not on file   Relationships     Social connections     Talks on phone: Not on file     Gets together: Not on file     Attends Quaker service: Not on file     Active member of club or organization: Not on file     Attends meetings of clubs or organizations: Not on file     Relationship status: Not on file     Intimate partner violence     Fear of current or ex partner: Not on file     Emotionally abused: Not on file     Physically abused: Not on file     Forced sexual activity: Not on file   Other Topics Concern     Not on file   Social History Narrative      Not on file      Current Outpatient Medications   Medication Sig Dispense Refill     aspirin 81 mg chewable tablet Chew 81 mg daily.       atorvastatin (LIPITOR) 10 MG tablet Take 1 tablet (10 mg total) by mouth daily. 90 tablet 3     sildenafil (REVATIO) 20 mg tablet Take 3 tablets (60 mg total) by mouth daily as needed. 90 tablet 3     No current facility-administered medications for this visit.       Objective:   Vital Signs: There were no vitals taken for this visit.       VisionScreening:  No exam data present     PHYSICAL EXAM  General: No apparent distress. Calm. Alert and Oriented X3. Pt behavior is appropriate.  Head:Atraumatic. Normocephalic, non-tender to palpation  Neck: Supple. No JVD. Full ROM.   Eyes: PERRL, No discharge. No strabismus. No nystagmus.  Ears: TMs pearly gray with landmarks visible.   Nose/Mouth/Throat: Patent nares, no oral lesions, pharynx clear and without exudate. Uvula mid-line. Nasal septum mid-line. Clear turbinates.   Lymph: No axillar or cervical adenopathy.   Chest/Lungs: Normal chest wall, clear to auscultation, normal respiratory effort and rate.   Heart/Pulses: Regular rate and rhythm, strong and equal radial pulses, no murmurs. Capillary refill <2 seconds. No edema.   Abdomen: Soft, no palpable masses. No hepatosplenomegaly, no tenderness with palpation noted. Bowel sounds active in all quadrants. No increased tympany.   Genitalia: Not examined.   Musculoskeletal: No CVA tenderness with palpation. Good ROM with extremities.   Neurologic: Interactive, alert, no focal findings, CNs intact.   Skin: Warm, dry. Normal hair pattern. Free of lesions. Normal skin turgor.         Assessment Results 9/20/2019   Activities of Daily Living No help needed   Instrumental Activities of Daily Living No help needed   Get Up and Go Score -   Mini Cog Total Score 3   Some recent data might be hidden     A Mini-Cog score of 0-2 suggests the possibility of dementia, score of 3-5 suggests no  dementia      Identified Health Risks:     Information on urinary incontinence and treatment options given to patient.  Patient's advanced directive was discussed and I am comfortable with the patient's wishes.

## 2021-06-11 NOTE — TELEPHONE ENCOUNTER
Upcoming Appointment Question  When is the appointment: Today  What is your appointment for?: Follow up  Who is your appointment scheduled with?: Dr. Ly  What is your question/concern?: Patient stated that he is concerned he may have PMR. Patient is questioning if he should or shouldn't have  lunch to eat.  Okay to leave a detailed message?: Yes  [phone]

## 2021-06-11 NOTE — PATIENT INSTRUCTIONS - HE
We will check a sedimentation rate today to rule out polymyalgia rheumatica.    If you do have evidence of polymyalgia rheumatica, we can start a prednisone taper.    Otherwise, follow-up with [facility] regarding your neck/shoulder issue.    Dr. Sood is an excellent orthopedic surgeon, based out of [facility] orthopedics.    We also have excellent neurosurgeons based out of [facility], such as Dr. Pfeiffer.  [facility] is another option.

## 2021-06-11 NOTE — TELEPHONE ENCOUNTER
Who is calling:  Patient  Reason for Call:  Im requesting to talk to Herberth Duffy MD or his assistant about my cholesterol and something else. Please return my call  Date of last appointment with primary care: 9/24/2020  Okay to leave a detailed message: Yes

## 2021-06-11 NOTE — PROGRESS NOTES
Clinic Note    Assessment:     Assessment and Plan:  1. Right shoulder pain, unspecified chronicity  Low clinical suspicion for polymyalgia rheumatica.  Nevertheless, we will check a sed rate today.  See patient instructions below for plan of care.    - Erythrocyte Sedimentation Rate       Patient Instructions   We will check a sedimentation rate today to rule out polymyalgia rheumatica.    If you do have evidence of polymyalgia rheumatica, we can start a prednisone taper.    Otherwise, follow-up with [facility] regarding your neck/shoulder issue.    Dr. Sood is an excellent orthopedic surgeon, based out of [facility] orthoped[facility].    We also have excellent neurosurgeons based out of [facility], such as Dr. Pfeiffer.  [facility] is another option.            Subjective:      Niranjan Dodge is a 75 y.o. male who comes to the clinic today for follow-up of his neck/shoulder pain.    He had been following with an orthopedic group up in [place] regarding some persistent neck and shoulder pain.  He had an MRI which did show some facet arthropathy; he did follow-up with a neurosurgeon on 9/25.  We reviewed that note in his exam room today.  He was not felt to be a candidate for laminectomy or cervical spine fusion.  The neurosurgeon did feel like his issue could be related to his shoulder and suggested that he have a consultation with an orthopedic surgeon.    Patient does have a follow-up with an orthopedic surgeon at the [facility] to discuss his MRI findings and to perform a shoulder exam.    Patient was discussing his case with a friend who was recently diagnosed with polymyalgia rheumatica.  Check is asking for a sed rate to be checked today.    He is asking for recommendations on orthopedic surgeon/neurosurgeons today.    The following portions of the patient's history were reviewed and updated as appropriate: Allergies, medications, problems, prior note.    Review of Systems:    Review is otherwise  negative except for what is mentioned above.     Social Hx:    Social History     Tobacco Use   Smoking Status Never Smoker   Smokeless Tobacco Never Used         Objective:     Vitals:    09/28/20 1508   BP: 126/66   Pulse: (!) 54   SpO2: 100%   Weight: 179 lb (81.2 kg)       Exam:    General: No apparent distress. Calm. Alert and Oriented X3. Pt behavior is appropriate.      Patient Active Problem List   Diagnosis     External Hemorrhoids     Ptosis Of Eyelid     Sleep Apnea     History of colon polyps     Hypercholesterolemia     Current Outpatient Medications   Medication Sig Dispense Refill     aspirin 81 mg chewable tablet Chew 81 mg daily.       atorvastatin (LIPITOR) 10 MG tablet Take 1 tablet (10 mg total) by mouth daily. 90 tablet 3     fluticasone propionate (FLONASE) 50 mcg/actuation nasal spray USE 1 SPRAY IN EACH NOSTRIL EVERY MORNING       sildenafil (REVATIO) 20 mg tablet Take 3 tablets (60 mg total) by mouth daily as needed. 90 tablet 3     No current facility-administered medications for this visit.          Gilbert Ly (Rob), JAMARI    9/28/2020

## 2021-06-12 NOTE — PROGRESS NOTES
[facility] Follow Up Note    Niranjan Dodge   72 y.o. male    Date of Visit: 8/23/2017    Chief Complaint   Patient presents with     Medicare Annual Wellness Visit Subsequent     fasting today      Subjective  Niranjan is here for an adult wellness visit/health maintenance exam.    Overall he does very well in his low cardiovascular risk.    No family history of heart disease or diabetes.    His mother had some hypertension in the late stroke.  A brother and 3 sisters alive and well.    Excellent cholesterol levels without need for treatment.  Is always had a normal blood pressure.  He has never smoked.  Her graph remains active with regular walking.  No chest pain or chest pressure.  No palpitations or history of syncope and no history of arrhythmia.    He has been eating a low carbohydrate diet, eats plenty vegetables, tries to eat a Mediterranean type diet.    He does take an aspirin a day, I again discussed with him that he does not have a strong indication to take an aspirin and I discussed bleeding risk with him.  He wishes to continue that.    He does have a history of mild sleep apnea with previous uvuloplasty.  He wears an oral appliance, he has had some mild daytime sleepiness.  He is wondering about sleep evaluation to see if he might be better served by a CPAP machine.  His wife has sleep apnea and uses CPAP.    He still drinks 2-3 alcohol drinks an evening.  That stable for him.  He does not feel it is a problem for him.  No falls or peripheral neuropathy symptoms.    Denies mood or anxiety issues.  He spends the santos in [place].  Summer at a cabin in [place], his wife spends most of her time at their condo in the [place].    Patient is a past history of basal cell cancer including a right eye resection this January in [place].  That has healed up well.  He saw dermatology in June of this year with some AK treatment.  He has a six-month follow-up this fall in  "[place].    Bowel movements are normal.  Colonoscopy September 2016 with an 8 mm rectal tubular adenoma.  GI gave him a 3 year follow-up.  His bowels are normal.  No blood in stool.    Urinating normally, one time a night nocturia.    Saw the optometrist last year, no vision changes or headaches.    No new cough or shortness of breath.    He sprained her right thumb this summer but mild and does not feel he needs an intervention.  Some poison ivy in his right arm is healing up, minimal itch now.    We discussed CODE STATUS, he does wish to be full code.  He did state if he has a catastrophic cerebral event and would not be expected to recover that he would not want to be kept alive on machines.    No other significant arthritis issues.  No swallowing difficulty.  No significant heartburn symptoms.    PMHx:  No past medical history on file.  PSHx:  No past surgical history on file.  Immunizations:   Immunization History   Administered Date(s) Administered     Influenza, seasonal,quad inj 6-35 mos 09/20/2012     Pneumo Conj 13-V (2010&after) 06/08/2015     Pneumo Polysac 23-V 06/02/2014     Tdap 06/30/2016     ZOSTER 05/07/2012       ROS A comprehensive review of systems was performed and was otherwise negative    Medications, allergies, and problem list were reviewed and updated    Exam  /70 (Patient Site: Right Arm, Patient Position: Sitting, Cuff Size: Adult Regular)  Pulse (!) 56  Ht 5' 9.25\" (1.759 m)  Wt 196 lb (88.9 kg)  SpO2 98%  BMI 28.74 kg/m2  Alert and oriented ×3 with normal mood and affect.  Pupils and irises are equal and reactive.  Extraocular muscles intact.  External ears and nose exam is normal.  Tympanic membranes normal.  Pharynx shows uvuloplasty but otherwise normal.  Teeth in good condition.  No leukoplakia or oral lesions.  No cervical or supraclavicular or axillary or inguinal adenopathy.  Heart is regular with no murmur rub or gallop.  +2 pedal pulses bilaterally and no ankle " edema.  Lungs are clear to auscultation and normal respiratory excursion.  No JVD and no carotid bruits.  No thyromegaly or nodularity.  Abdomen is nonobese nontender no hepatosplenomegaly and no pulsatile mass.  No ankle edema.  Feet in good condition.  He does have a sclerotic area but uniform and well demarcated, likely a keratotic seborrheic keratosis on his left anterior shin.  This was noted to patient who will see dermatology about this fall.  Gait is normal.  Muscle skeletal exam normal.  Testicles normal bilaterally.  No inguinal hernia.  Prostate is smooth symmetric, normal size without nodularity.    Clock face normal.  Memory testing within normal limits, he had slight difficulty with 1 word.    Full review of systems negative.  Gait normal.  Get up and go test normal.    Assessment/Plan  1. Health care maintenance  Low cardiovascular risk.    I discussed importance of regular activity and Mediterranean type diet.    Every other year eye exam next year.    Routine prostate cancer screening.    I discussed the importance of moderation of alcohol.  - Comprehensive Metabolic Panel  - HM2(CBC w/o Differential)  - Lipid Cascade    CODE STATUS discussion as above, full code.    2. History of colon polyps  3 year follow-up September 2019    3. Prostate cancer screening    - PSA (Prostatic-Specific Antigen), Annual Screen    4. Medication monitoring encounter  Aspirin per patient with  - Comprehensive Metabolic Panel  - HM2(CBC w/o Differential)    5. Sleep apnea, unspecified type  Patient wishes referral for sleep study to evaluate for possible CPAP  - Ambulatory referral to Sleep Medicine    6. History of basal cell cancer  Follow-up this fall with dermatology.  Left shin sclerotic lesion noted the patient    Return in about 1 year (around 8/23/2018) for Annual physical.   There are no Patient Instructions on file for this visit.  Herberth Duffy MD      Current Outpatient Prescriptions   Medication Sig  Dispense Refill     aspirin 81 mg chewable tablet Chew 81 mg daily.       OMEGA-3/DHA/EPA/FISH OIL (FISH OIL-OMEGA-3 FATTY ACIDS) 300-1,000 mg capsule Take 2 g by mouth daily.       No current facility-administered medications for this visit.      No Known Allergies  Social History   Substance Use Topics     Smoking status: Never Smoker     Smokeless tobacco: None     Alcohol use None

## 2021-06-13 NOTE — PROGRESS NOTES
Dear  Herberth Duffy Md  [address] [place] [address]    Thank you for the opportunity to participate in the care of  Niranjan Dodge.    He is a 72 y.o. y/o who comes to the clinic for evaluation of his sleep apnea.    The patient was diagnosed with obstructive sleep apnea more than 20 years ago in [place]. He recalls using a CPAP device for some time but he then switched to a dental device. He has been using his dental device every night but he has been told that he is snoring despite using his device. He already attempted to increase the degree of protrusion on his dental device and that did not work.    We talked about his experience with CPAP when he tried PAP therapy. He recalls that it was not uncomfortable but it was inconvenient because he was dating.     Epworths Sleepiness Scale 9/18/2017   Sitting and reading 2   Watching TV 3   Sitting, inactive in a public place (e.g. a theatre or a meeting) 2   As a passenger in a car for an hour without a break 2   Lying down to rest in the afternoon when circumstances permit 2   Sitting and talking to someone 1   Sitting quietly after a lunch without alcohol 0   In a car, while stopped for a few minutes in traffic 0   Total score 12   Rooming 9/18/2017   Usual bedtime 11pm   Sleep Latency 5 min   Awakenings 2-3times   Wake Up Time 5:30am   Weekends same   Energy Drinks 0   Coffee 0   Cola 0   Difficulty falling asleep No   Difficulty staying asleep Yes   Excessive daytime tiredness Yes   Excessive daytime sleepiness Yes   Dozing off while driving No   Shift Worker No   Sleep Walking? No   Sleep Talking? No   Kicking or punching? No   Restless legs symptoms No   STOP BANG 9/18/2017   Do you snore loudly (louder than talking or loud enough to be heard through closed doors)? 0   Do you often feel tired, fatigued, or sleepy during daytime? 1   Has anyone observed you stop breathing in your sleep? 1   Do you have or are you being treated for high  blood pressure? 0   BMI more than 35 kg/m2 0   Age over 50 years old? 1   Neck circumference greater than 16 inches? 0   Gender male? 1   Total Score 4       Past Medical History  Patient Active Problem List   Diagnosis     External Hemorrhoids     Ptosis Of Eyelid     Sleep Apnea     Dermatitis     History of colon polyps        Past Surgical History  No past surgical history on file.     Meds  Current Outpatient Prescriptions   Medication Sig Dispense Refill     aspirin 81 mg chewable tablet Chew 81 mg daily.       OMEGA-3/DHA/EPA/FISH OIL (FISH OIL-OMEGA-3 FATTY ACIDS) 300-1,000 mg capsule Take 2 g by mouth daily.       No current facility-administered medications for this visit.         Allergies  Review of patient's allergies indicates no known allergies.     Social History  Social History     Social History     Marital status: Single     Spouse name: N/A     Number of children: N/A     Years of education: N/A     Occupational History     Not on file.     Social History Main Topics     Smoking status: Never Smoker     Smokeless tobacco: Not on file     Alcohol use Not on file     Drug use: Not on file     Sexual activity: Not on file     Other Topics Concern     Not on file     Social History Narrative        Family History  Family History   Problem Relation Age of Onset     Sleep apnea Father      Snoring Father            Review of Systems:  Constitutional: Negative except as noted in HPI.   Eyes: Negative except as noted in HPI.   ENT: Negative except as noted in HPI.   Cardiovascular: Negative except as noted in HPI.   Respiratory: Negative except as noted in HPI.   Gastrointestinal: Negative except as noted in HPI.   Genitourinary: Negative except as noted in HPI.   Musculoskeletal: Negative except as noted in HPI.   Integumentary: Negative except as noted in HPI.   Neurological: Negative except as noted in HPI.   Psychiatric: Negative except as noted in HPI.   Endocrine: Negative except as noted in HPI.  "  Hematologic/Lymphatic: Negative except as noted in HPI.      Physical Exam:  /72  Pulse (!) 56  Ht 5' 10\" (1.778 m)  Wt 194 lb (88 kg)  SpO2 99%  BMI 27.84 kg/m2  BMI:Body mass index is 27.84 kg/(m^2).   GEN: NAD  Head: Normocephalic.  EYES: PERRLA, EOMI  ENT: Oropharynx is clear, S/P uvulectomy.  Neurological: Alert, oriented to time, place, and person.  Psych:  normal mood, normal affect     Labs/Studies:     Lab Results   Component Value Date    WBC 4.6 08/23/2017    HGB 15.9 08/23/2017    HCT 47.0 08/23/2017    MCV 95 08/23/2017     08/23/2017         Chemistry        Component Value Date/Time     08/23/2017 0845    K 5.3 (H) 08/23/2017 0845     08/23/2017 0845    CO2 29 08/23/2017 0845    BUN 13 08/23/2017 0845    CREATININE 0.88 08/23/2017 0845    GLU 98 08/23/2017 0845        Component Value Date/Time    CALCIUM 9.6 08/23/2017 0845    ALKPHOS 80 08/23/2017 0845    AST 27 08/23/2017 0845    ALT 22 08/23/2017 0845    BILITOT 1.7 (H) 08/23/2017 0845            Assessment and Plan:  In summary Niranjan Dodge is a 72 y.o. year old male here for consultation.    1. Obstructive sleep apnea.   We will order a new PSG to split if AHI > 5  He should stop using his device 3 nights prior to the sleep test.  If he needs PAP therapy, we will try to find a DME close to his home.     Patient verbalized understanding of these issues, agrees with the plan and all questions were answered today. Patient was given an opportuntity to voice any other symptoms or concerns not listed above. Patient did not have any other symptoms or concerns.      Patient told to return in 10-12 weeks.     MD VIKTORIA No Board Certified in Internal Medicine and Sleep Medicine  [facility].    "

## 2021-06-13 NOTE — TELEPHONE ENCOUNTER
Refill Approved    Rx renewed per Medication Renewal Policy. Medication was last renewed on seen 9/28/20.    Rylie Daily, [facility] Triage/Med Refill 11/10/2020     Requested Prescriptions   Pending Prescriptions Disp Refills     atorvastatin (LIPITOR) 10 MG tablet 90 tablet 3     Sig: Take 1 tablet (10 mg total) by mouth daily.       Statins Refill Protocol (Hmg CoA Reductase Inhibitors) Failed - 11/9/2020 12:05 PM        Failed - PCP or prescribing provider visit in past 12 months      Last office visit with prescriber/PCP: 5/14/2018 Herberth Duffy MD OR same dept: Visit date not found OR same specialty: 5/14/2018 Herberth Duffy MD  Last physical: 9/4/2018 Last MTM visit: Visit date not found   Next visit within 3 mo: Visit date not found  Next physical within 3 mo: Visit date not found  Prescriber OR PCP: Herberth Duffy MD  Last diagnosis associated with med order: 1. Hypercholesterolemia  - atorvastatin (LIPITOR) 10 MG tablet; Take 1 tablet (10 mg total) by mouth daily.  Dispense: 90 tablet; Refill: 3    If protocol passes may refill for 12 months if within 3 months of last provider visit (or a total of 15 months).

## 2021-06-13 NOTE — PROGRESS NOTES
Order for Durable Medical Equipment was processed and equipment ordered.   DME provider: Kevin  Date Faxed: 10/24/17  Ordering Provider:   Equipment ordered: Cpap

## 2021-06-16 PROBLEM — Z86.0100 HISTORY OF COLON POLYPS: Status: ACTIVE | Noted: 2017-08-23

## 2021-06-16 PROBLEM — E78.00 HYPERCHOLESTEROLEMIA: Status: ACTIVE | Noted: 2018-09-04

## 2021-06-17 NOTE — TELEPHONE ENCOUNTER
5-4-21 Submitted online [facility].     Facility will reach out to assist with scheduling appointment.

## 2021-06-17 NOTE — PATIENT INSTRUCTIONS - HE
Patient Instructions by Gilbert Ly CNP at 9/20/2019  9:30 AM     Author: Gilbert Ly CNP Service: -- Author Type: Nurse Practitioner    Filed: 9/20/2019  9:50 AM Encounter Date: 9/20/2019 Status: Addendum    : Gilbert Ly CNP (Nurse Practitioner)    Related Notes: Original Note by Gilbert Ly CNP (Nurse Practitioner) filed at 9/20/2019  9:49 AM       We will check a variety of labs today.  Results will be mailed to you.    Blood pressure and other vital signs look good today.    Refills of your medication sent into pharmacy.      Patient Education   Urinary Incontinence (Male)    Urinary incontinence means not being able to control the release of urine from the bladder.  Causes  Common causes of urinary incontinence in men include:    Infection    Certain medicines    Aging    Poor pelvic muscle tone    Bladder spasms    Obesity    Urinary retention  Nervous system diseases, diabetes, sleep apnea, urinary tract infections, prostate surgery, and pelvic trauma can also cause incontinence. Constipation and smoking have also been identified as risk factors.  Symptoms    Urge incontinence (also called overactive bladder) is a sudden urge to urinate even though there may not be much urine in the bladder. The need to urinate often during the night is common. It is due to bladder spasms.    Stress incontinence is involuntary urine leakage that can occur with sneezing, coughing, and other actions that put stress on the bladder.  Treatment  Treatment of urinary incontinence depends on the cause. Infections of the bladder are treated with antibiotics. Urinary retention is treated with a bladder catheter.  Home care  Follow these guidelines when caring for yourself at home:    Don't consume foods and drinks that may irritate the bladder. These include drinks containing alcohol, caffeinate, or carbonation; chocolate; and acidic fruits and juices.    Limit fluid intake to 6 to 8 cups a day.    Lose  weight if you are overweight. This will reduce your symptoms.    If needed, wear absorbent pads to catch urine. Change pads frequently to maintain hygiene and prevent skin and bladder infections.    Bathe daily to maintain good hygiene.    If an antibiotic was prescribed to treat a bladder infection, be sure to take it until finished, even if you are feeling better before then. This is to make sure your infection has cleared.    If a catheter was left in place, it is important to keep bacteria from getting into the collection bag. Don't disconnect the catheter from the collection bag.    Use a leg band to secure the catheter drainage tube, so it does not pull on the catheter. Drain the collection bag when it becomes full using the drain spout at the bottom of the bag. Don't disconnect the bag from the catheter.    Don't pull on or try to remove a catheter. The catheter must be removed by a healthcare provider.  Follow-up care  Follow up with your healthcare provider, or as advised.  When to seek medical advice  Call your healthcare provider right away if any of these occur:    Fever over 100.4 F (38 C), or as directed by your healthcare provider    Bladder pain or fullness    Abdominal swelling, nausea, or vomiting    Back pain    Weakness, dizziness, or fainting    If a catheter was left in place, return if:  ? Catheter falls out  ? Catheter stops draining for 6 hours  Date Last Reviewed: 10/1/2017    2251-9185 [employer]. [address] [place] [address]. All rights reserved. This information is not intended as a substitute for professional medical care. Always follow your healthcare professional's instructions.       Advance Directive  Patients advance directive was discussed and I am comfortable with the patients wishes.  Patient Education   Personalized Prevention Plan  You are due for the preventive services outlined below.  Your care team is available to assist you in scheduling these  services.  If you have already completed any of these items, please share that information with your care team to update in your medical record.  Health Maintenance   Topic Date Due   ? HEPATITIS C SCREENING  1945   ? ZOSTER VACCINES (2 of 3) 07/02/2012   ? INFLUENZA VACCINE RULE BASED (1) 08/01/2019   ? FALL RISK ASSESSMENT  09/04/2019   ? COLONOSCOPY  09/14/2019   ? MEDICARE ANNUAL WELLNESS VISIT  09/04/2019   ? ADVANCE CARE PLANNING  09/04/2023   ? TD 18+ HE  06/30/2026   ? PNEUMOCOCCAL POLYSACCHARIDE VACCINE AGE 65 AND OVER  Completed   ? PNEUMOCOCCAL CONJUGATE VACCINE FOR ADULTS (PCV13 OR PREVNAR)  Completed

## 2021-06-17 NOTE — TELEPHONE ENCOUNTER
Reason for Call: Request for an order or referral:    Order or referral being requested: Dermatology    Date needed: as soon as possible    Has the patient been seen by the PCP for this problem? YES and Not Applicable    Additional comments: Patient seen in [place] for a scrape and burn on his lower leg so patient wants to see dermatology for this. Can you place this referral? Patient states they have the paperwork from [place] provider that they can get to PCP to look over.     Phone number Patient can be reached at:  Home number on file [phone] (home)    Best Time:  Any    Can we leave a detailed message on this number?  Yes    Call taken on 5/3/2021 at 2:21 PM by Carla Zee

## 2021-06-18 NOTE — PROGRESS NOTES
[facility] Follow Up Note    Niranjan Dodge   73 y.o. male    Date of Visit: 5/14/2018    Chief Complaint   Patient presents with     Follow-up     fasting, tail bone issues whe sitting.      Susanne Ureña is here for follow-up of his hypercholesterolemia.  He does have a high HDL as well.  He previously did not have a family history of heart disease, but his brother who is 7 years older than him just had a heart attack in January.    Patient's blood pressure has been normal and not needing medication.    He has never smoked.    He has not had chest pain or cardiac event.    He was recently diagnosed with moderate sleep apnea October 2017.  Is now using CPAP every night.  Moderate restless legs noted as well.  He feels he sleeps better at night and is using the CPAP.  He still has an oral appliance for traveling, but is not using it.  No morning headaches.  Her graph no palpitations or history of arrhythmia.  Does feel he thinks a little clearer and less sleepy during the day now.  He has lost 9 pounds since last visit.    He is very active with regular golfing and walking over the winter in [place].    He did develop some bilateral lower back pain and tightness when he sits down after being active, but the tightness goes away within minutes.  There is no radicular type symptoms.  There is no back pain when walking or playing golf.    He has been doing occult a lot of golf lessons and changing his golf swing this winter, and he thinks it may have had something to do with it.    The bilateral lower back tightness is been going on for the last month.  He has not been treated with any pain medicine.  No falls or injury.  There is no midline pain.  No bowel or bladder changes.  No radicular type symptoms.    His diet is been excellent, Mediterranean type diet.    PMHx:  No past medical history on file.  PSHx:  No past surgical history on file.  Immunizations:   Immunization History  "  Administered Date(s) Administered     Influenza, seasonal,quad inj 6-35 mos 09/20/2012     Pneumo Conj 13-V (2010&after) 06/08/2015     Pneumo Polysac 23-V 06/02/2014     Tdap 06/30/2016     ZOSTER, LIVE 05/07/2012       ROS A comprehensive review of systems was performed and was otherwise negative    Medications, allergies, and problem list were reviewed and updated    Exam  /72  Pulse (!) 54  Ht 5' 10\" (1.778 m)  Wt 184 lb (83.5 kg)  SpO2 97%  BMI 26.4 kg/m2  Appears well.  Gait normal.  Bilateral parasacral muscle tightness discomfort.  No midline pain or tenderness to palpation.  No bruising or rash.  No leg weakness.  No leg edema.  Abdomen is nontender no hepatomegaly.  Heart is regular with no murmur.    Assessment/Plan  1. Hypercholesterolemia  Moderately high LDL cholesterol.  But also high HDL.  He had a fairly low cardiovascular risk profile in the past, but does have a brother with heart disease now.    I did discuss option for screening cardiac CT scan for calcium score, he is interested in that if his LDL is above 160.    If LDL above 160, I would have him proceed with cardiac CT scan and consider statin drug if positive scan.    I did discuss toxicity risk of statin drugs, including muscle, liver, and other risks.  Patient states he would accept these risks if he was to proceed with statin drug.    If he does not start a new drug, I will see him fasting in September for his physical.  - Lipid Cascade    Continue regular exercise and Mediterranean type diet.    2. Sleep apnea, unspecified type  Now tolerating CPAP.  Has lost some weight.    He was drinking 2-3 drinks in the evening in the past, reduce alcohol had been recommended.    3. History of colon polyps  3 year follow-up September 2019    Lower back tightness when sitting.  Does not bother him during the day.  He may develop some strain with his increased golfing.  No radicular symptoms.  No pain or tenderness now.  Follow-up if " that worsens.    Return in about 4 months (around 9/14/2018) for Annual physical.   Patient Instructions   Continue with regular exercise and Mediterranean type diet.    Check cholesterol levels today.  If your LDL cholesterol is more than 160, I will recommend that you consider a cardiac CT scan for calcium score to screen for coronary artery disease.    Increase regular walking for your lower back pain.  You can request further evaluation and/or physical therapy, that worsens the summer.    Follow-up in September for your adult wellness visit.    Herberth Duffy MD      Current Outpatient Prescriptions   Medication Sig Dispense Refill     aspirin 81 mg chewable tablet Chew 81 mg daily.       OMEGA-3/DHA/EPA/FISH OIL (FISH OIL-OMEGA-3 FATTY ACIDS) 300-1,000 mg capsule Take 2 g by mouth daily.       sildenafil (VIAGRA) 50 MG tablet Take 1 tablet (50 mg total) by mouth daily as needed for erectile dysfunction. 30 tablet 3     No current facility-administered medications for this visit.      No Known Allergies  Social History   Substance Use Topics     Smoking status: Never Smoker     Smokeless tobacco: Never Used     Alcohol use None

## 2021-06-18 NOTE — PATIENT INSTRUCTIONS - HE
Patient Instructions by Gilbert Ly CNP at 9/21/2020  9:00 AM     Author: Gilbert Ly CNP Service: -- Author Type: Nurse Practitioner    Filed: 9/21/2020  9:38 AM Encounter Date: 9/21/2020 Status: Addendum    : Gilbert Ly CNP (Nurse Practitioner)    Related Notes: Original Note by Gilbert Ly CNP (Nurse Practitioner) filed at 9/21/2020  9:09 AM       Recheck kidney labs and electrolytes today.    Recheck PSA.    Colonoscopy due in 2024.    You Kirill had your flu shot this year.    Recheck cholesterol.    Recheck blood counts.    Blood pressure and other vital signs look good today.    Weight looks stable.    Follow-up with your sports medicine specialist regarding your MRI results.  We will print out the results for you today.      Patient Education   Urinary Incontinence (Male)    Urinary incontinence means not being able to control the release of urine from the bladder.  Causes  Common causes of urinary incontinence in men include:    Infection    Certain medicines    Aging    Poor pelvic muscle tone    Bladder spasms    Obesity    Urinary retention  Nervous system diseases, diabetes, sleep apnea, urinary tract infections, prostate surgery, and pelvic trauma can also cause incontinence. Constipation and smoking have also been identified as risk factors.  Symptoms    Urge incontinence (also called overactive bladder) is a sudden urge to urinate even though there may not be much urine in the bladder. The need to urinate often during the night is common. It is due to bladder spasms.    Stress incontinence is involuntary urine leakage that can occur with sneezing, coughing, and other actions that put stress on the bladder.  Treatment  Treatment of urinary incontinence depends on the cause. Infections of the bladder are treated with antibiotics. Urinary retention is treated with a bladder catheter.  Home care  Follow these guidelines when caring for yourself at home:    Don't consume foods  and drinks that may irritate the bladder. These include drinks containing alcohol, caffeinate, or carbonation; chocolate; and acidic fruits and juices.    Limit fluid intake to 6 to 8 cups a day.    Lose weight if you are overweight. This will reduce your symptoms.    If needed, wear absorbent pads to catch urine. Change pads frequently to maintain hygiene and prevent skin and bladder infections.    Bathe daily to maintain good hygiene.    If an antibiotic was prescribed to treat a bladder infection, be sure to take it until finished, even if you are feeling better before then. This is to make sure your infection has cleared.    If a catheter was left in place, it is important to keep bacteria from getting into the collection bag. Don't disconnect the catheter from the collection bag.    Use a leg band to secure the catheter drainage tube, so it does not pull on the catheter. Drain the collection bag when it becomes full using the drain spout at the bottom of the bag. Don't disconnect the bag from the catheter.    Don't pull on or try to remove a catheter. The catheter must be removed by a healthcare provider.  Follow-up care  Follow up with your healthcare provider, or as advised.  When to seek medical advice  Call your healthcare provider right away if any of these occur:    Fever over 100.4 F (38 C), or as directed by your healthcare provider    Bladder pain or fullness    Abdominal swelling, nausea, or vomiting    Back pain    Weakness, dizziness, or fainting    If a catheter was left in place, return if:  ? Catheter falls out  ? Catheter stops draining for 6 hours  Date Last Reviewed: 10/1/2017    2036-3744 [employer]. [address] [place] [address], [place] [address]. All rights reserved. This information is not intended as a substitute for professional medical care. Always follow your healthcare professional's instructions.       Advance Directive  Patients advance directive was discussed and I am  comfortable with the patients wishes.  Patient Education   Personalized Prevention Plan  You are due for the preventive services outlined below.  Your care team is available to assist you in scheduling these services.  If you have already completed any of these items, please share that information with your care team to update in your medical record.  Health Maintenance   Topic Date Due   ? HEPATITIS C SCREENING  1945   ? ZOSTER VACCINES (2 of 3) 07/02/2012   ? INFLUENZA VACCINE RULE BASED (1) 08/01/2020   ? FALL RISK ASSESSMENT  09/20/2020   ? MEDICARE ANNUAL WELLNESS VISIT  09/20/2020   ? LIPID  09/20/2024   ? ADVANCE CARE PLANNING  09/20/2024   ? TD 18+ HE  06/30/2026   ? COLORECTAL CANCER SCREENING  09/24/2029   ? PNEUMOCOCCAL IMMUNIZATION 65+ LOW/MEDIUM RISK  Completed   ? HEPATITIS B VACCINES  Aged Out

## 2021-06-19 NOTE — LETTER
Letter by Gilbert Ly CNP at      Author: Gilbert Ly CNP Service: -- Author Type: --    Filed:  Encounter Date: 9/23/2019 Status: Signed         Niranjan Dodge  [address]  [place] [address]             September 23, 2019         Dear Mr. Dodge,    Below are the results from your recent visit:    Resulted Orders   Comprehensive Metabolic Panel   Result Value Ref Range    Sodium 140 136 - 145 mmol/L    Potassium 4.5 3.5 - 5.0 mmol/L    Chloride 104 98 - 107 mmol/L    CO2 25 22 - 31 mmol/L    Anion Gap, Calculation 11 5 - 18 mmol/L    Glucose 92 70 - 125 mg/dL    BUN 17 8 - 28 mg/dL    Creatinine 0.99 0.70 - 1.30 mg/dL    GFR MDRD Af Amer >60 >60 mL/min/1.73m2    GFR MDRD Non Af Amer >60 >60 mL/min/1.73m2    Bilirubin, Total 1.5 (H) 0.0 - 1.0 mg/dL    Calcium 9.8 8.5 - 10.5 mg/dL    Protein, Total 7.5 6.0 - 8.0 g/dL    Albumin 4.3 3.5 - 5.0 g/dL    Alkaline Phosphatase 85 45 - 120 U/L    AST 28 0 - 40 U/L    ALT 26 0 - 45 U/L    Narrative    Fasting Glucose reference range is 70-99 mg/dL per  American Diabetes Association (ADA) guidelines.   HM2(CBC w/o Differential)   Result Value Ref Range    WBC 5.2 4.0 - 11.0 thou/uL    RBC 4.75 4.40 - 6.20 mill/uL    Hemoglobin 15.7 14.0 - 18.0 g/dL    Hematocrit 46.0 40.0 - 54.0 %    MCV 97 80 - 100 fL    MCH 33.1 27.0 - 34.0 pg    MCHC 34.2 32.0 - 36.0 g/dL    RDW 10.6 (L) 11.0 - 14.5 %    Platelets 184 140 - 440 thou/uL    MPV 7.7 7.0 - 10.0 fL   Lipid Profile   Result Value Ref Range    Triglycerides 54 <=149 mg/dL    Cholesterol 156 <=199 mg/dL    LDL Calculated 75 <=129 mg/dL    HDL Cholesterol 70 >=40 mg/dL    Patient Fasting > 8hrs? Yes    PSA (Prostatic-Specific Antigen), Annual Screen   Result Value Ref Range    PSA 0.4 0.0 - 6.5 ng/mL    Narrative    Method is Abbott Prostate-Specific Antigen (PSA)  Standard-WHO 1st International (90:10)   Vitamin D, Total (25-Hydroxy)   Result Value Ref Range    Vitamin D, Total (25-Hydroxy) 37.1 30.0 -  80.0 ng/mL    Narrative    Deficiency <10.0 ng/mL  Insufficiency 10.0-29.9 ng/mL  Sufficiency 30.0-80.0 ng/mL  Toxicity (possible) >100.0 ng/mL       Your labs all look normal/stable.  No change in treatment plan for now.    Please call with questions or contact us using [employer].    Sincerely,        Electronically signed by Gilbert Ly CNP

## 2021-06-19 NOTE — LETTER
Letter by Herberth Duffy MD at      Author: Herberth Duffy MD Service: -- Author Type: --    Filed:  Encounter Date: 5/14/2019 Status: (Other)         Niranjan Dodge  [address]  [place] [address]             May 14, 2019         Dear Mr. Dodge,    Below are the results from your recent visit:    Resulted Orders   Hepatic Profile   Result Value Ref Range    Bilirubin, Total 1.3 (H) 0.0 - 1.0 mg/dL    Bilirubin, Direct 0.5 <=0.5 mg/dL    Protein, Total 7.0 6.0 - 8.0 g/dL    Albumin 4.1 3.5 - 5.0 g/dL    Alkaline Phosphatase 77 45 - 120 U/L    AST 27 0 - 40 U/L    ALT 27 0 - 45 U/L   Lipid Cascade   Result Value Ref Range    Cholesterol 164 <=199 mg/dL    Triglycerides 46 <=149 mg/dL    HDL Cholesterol 72 >=40 mg/dL    LDL Calculated 83 <=129 mg/dL    Patient Fasting > 8hrs? Yes        Liver tests are normal.  Bilirubin level is okay.    Excellent cholesterol levels.    Please call with questions or contact us using [employer].    Sincerely,        Electronically signed by Herberth Duffy MD

## 2021-06-20 NOTE — LETTER
Letter by Gilbert Ly CNP at      Author: Gilbert Ly CNP Service: -- Author Type: --    Filed:  Encounter Date: 9/22/2020 Status: (Other)         Niranjan Dodge  [address]  [place] [address]             September 22, 2020         Dear Mr. Dodge,    Below are the results from your recent visit:    Resulted Orders   PSA (Prostatic-Specific Antigen), Annual Screen   Result Value Ref Range    PSA 0.4 ng/mL   Lipid Profile   Result Value Ref Range    Triglycerides 53 mg/dL    Cholesterol 195 mg/dL    LDL Calculated 94 mg/dL    HDL Cholesterol 90 mg/dL    Patient Fasting > 8hrs? Yes    Comprehensive Metabolic Panel   Result Value Ref Range    Sodium 140 mmol/L    Potassium 4.5 mmol/L    Chloride 105 mmol/L    CO2 25 mmol/L    Anion Gap, Calculation 10 mmol/L    Glucose 78 mg/dL    BUN 16 mg/dL    Creatinine 0.80 mg/dL    GFR MDRD Af Amer >60 mL/min/1.73m2    GFR MDRD Non Af Amer >60 mL/min/1.73m2    Bilirubin, Total 2.2 (H) mg/dL    Calcium 9.3 mg/dL    Protein, Total 7.3 g/dL    Albumin 4.3 g/dL    Alkaline Phosphatase 76 U/L    AST 30 U/L    ALT 27 U/L   HM2(CBC w/o Differential)   Result Value Ref Range    WBC 4.5 4.0 - 11.0 thou/uL    RBC 4.76 4.40 - 6.20 mill/uL    Hemoglobin 15.3 14.0 - 18.0 g/dL    Hematocrit 46.4 40.0 - 54.0 %    MCV 97 80 - 100 fL    MCH 32.2 27.0 - 34.0 pg    MCHC 33.0 32.0 - 36.0 g/dL    RDW 11.0 11.0 - 14.5 %    Platelets 156 140 - 440 thou/uL    MPV 8.2 7.0 - 10.0 fL       Lab results are all stable.    PSA normal.    Cholesterol labs continue to look excellent.    Liver/kidney labs normal.  Normal electrolytes.    Normal blood counts.    Follow-up with orthopedic specialist as planned, for neck issues.    Please call with questions or contact us using [employer].    Sincerely,        Electronically signed by Gilbert Ly CNP

## 2021-06-23 NOTE — TELEPHONE ENCOUNTER
RECORDS RECEIVED FROM: Self   Date of Appt: 7/2/21   NOTES (FOR ALL VISITS) STATUS DETAILS   OFFICE NOTE from referring provider N/A    OFFICE NOTE from other specialist N/A    DISCHARGE SUMMARY from hospital N/A    DISCHARGE REPORT from the ER N/A    OPERATIVE REPORT N/A    MEDICATION LIST Care Everywhere    IMAGING  (FOR ALL VISITS)     EMG N/A    EEG N/A    LUMBAR PUNCTURE N/A    ILAN SCAN N/A    ULTRASOUND (CAROTID BILAT) *VASCULAR* N/A    MRI (HEAD, NECK, SPINE) Received [facility]:  MRI Cervical Spine 9/17/20   CT (HEAD, NECK, SPINE) N/A       Action 6/23/21 MV 8.27am   Action Taken Imaging request faxed to [facility] for:  MRI C Spine 9/17/20     Action 6/24/21 MV 7.02am   Action Taken Images resolved in PACS    Patient will be bringing records from [facility] (per appt notes)

## 2021-06-24 ASSESSMENT — MOVEMENT DISORDERS SOCIETY - UNIFIED PARKINSONS DISEASE RATING SCALE (MDS-UPDRS)
CHEWING_AND_SWALLOWING: NORMAL: NO PROBLEMS.
DRESSING: NORMAL: NOT AT ALL (NO PROBLEMS).
HOBBIES_AND_OTHER_ACTIVITIES: NORMAL:  NOT AT ALL (NO PROBLEMS).
TOTAL_SCORE: 6
EATING_TASKS: NORMAL: NOT AT ALL (NO PROBLEMS).
FREEZING: NORMAL: NOT AT ALL (NO PROBLEMS).
TREMOR: MODERATE: SHAKING OR TREMOR CAUSES PROBLEMS WITH MANY OF MY DAILY ACTIVITIES.
GETTING_OUT_OF_BED_CAR_DEEP_CHAIR: NORMAL: NOT AT ALL (NO PROBLEMS).
TURNING_IN_BED: NORMAL: NOT AT ALL (NO PROBLEMS).
SPEECH: NORMAL: NOT AT ALL (NO PROBLEMS).
HANDWRITING: MODERATE: MANY WORDS ARE UNCLEAR AND DIFFICULT TO READ.
SALIVA_AND_DROOLING: NORMAL: NOT AT ALL (NO PROBLEMS).
WALKING_AND_BALANCE: NORMAL: NOT AT ALL (NO PROBLEMS).
HYGIENE: NORMAL: NOT AT ALL (NO PROBLEMS).

## 2021-06-27 ENCOUNTER — HEALTH MAINTENANCE LETTER (OUTPATIENT)
Age: 76
End: 2021-06-27

## 2021-07-02 ENCOUNTER — OFFICE VISIT (OUTPATIENT)
Dept: NEUROLOGY | Facility: CLINIC | Age: 76
End: 2021-07-02
Payer: MEDICARE

## 2021-07-02 ENCOUNTER — PRE VISIT (OUTPATIENT)
Dept: NEUROLOGY | Facility: CLINIC | Age: 76
End: 2021-07-02

## 2021-07-02 VITALS
RESPIRATION RATE: 16 BRPM | SYSTOLIC BLOOD PRESSURE: 128 MMHG | DIASTOLIC BLOOD PRESSURE: 59 MMHG | HEART RATE: 60 BPM | OXYGEN SATURATION: 100 %

## 2021-07-02 DIAGNOSIS — G25.0 ESSENTIAL TREMOR: Primary | ICD-10-CM

## 2021-07-02 PROCEDURE — 99205 OFFICE O/P NEW HI 60 MIN: CPT | Performed by: NURSE PRACTITIONER

## 2021-07-02 PROCEDURE — 99417 PROLNG OP E/M EACH 15 MIN: CPT | Performed by: NURSE PRACTITIONER

## 2021-07-02 SDOH — ECONOMIC STABILITY: TRANSPORTATION INSECURITY
IN THE PAST 12 MONTHS, HAS LACK OF TRANSPORTATION KEPT YOU FROM MEETINGS, WORK, OR FROM GETTING THINGS NEEDED FOR DAILY LIVING?: NOT ASKED

## 2021-07-02 SDOH — HEALTH STABILITY: MENTAL HEALTH: HOW MANY STANDARD DRINKS CONTAINING ALCOHOL DO YOU HAVE ON A TYPICAL DAY?: 1 OR 2

## 2021-07-02 SDOH — HEALTH STABILITY: MENTAL HEALTH: HOW OFTEN DO YOU HAVE A DRINK CONTAINING ALCOHOL?: 4 OR MORE TIMES A WEEK

## 2021-07-02 SDOH — ECONOMIC STABILITY: INCOME INSECURITY: HOW HARD IS IT FOR YOU TO PAY FOR THE VERY BASICS LIKE FOOD, HOUSING, MEDICAL CARE, AND HEATING?: NOT ASKED

## 2021-07-02 SDOH — ECONOMIC STABILITY: TRANSPORTATION INSECURITY
IN THE PAST 12 MONTHS, HAS THE LACK OF TRANSPORTATION KEPT YOU FROM MEDICAL APPOINTMENTS OR FROM GETTING MEDICATIONS?: NOT ASKED

## 2021-07-02 SDOH — HEALTH STABILITY: MENTAL HEALTH: HOW OFTEN DO YOU HAVE 6 OR MORE DRINKS ON ONE OCCASION?: NEVER

## 2021-07-02 SDOH — ECONOMIC STABILITY: FOOD INSECURITY: WITHIN THE PAST 12 MONTHS, THE FOOD YOU BOUGHT JUST DIDN'T LAST AND YOU DIDN'T HAVE MONEY TO GET MORE.: NOT ASKED

## 2021-07-02 SDOH — ECONOMIC STABILITY: FOOD INSECURITY: WITHIN THE PAST 12 MONTHS, YOU WORRIED THAT YOUR FOOD WOULD RUN OUT BEFORE YOU GOT MONEY TO BUY MORE.: NOT ASKED

## 2021-07-02 ASSESSMENT — ACTIVITIES OF DAILY LIVING (ADL)
WORKING: (1) SLIGHTLY ABNORMAL. TREMOR IS PRESENT BUT DOES NOT AFFECT PERFORMANCE AT WORK OR AT HOME.
HYGIENE: (3) MODERATELY ABNORMAL. UNABLE TO DO MOST FINE TASKS SUCH AS PUTTING ON LIPSTICK OR SHAVING UNLESS CHANGES STRATEGY SUCH AS USING TWO HANDS OR USING THE LESS AFFECTED HAND.
TOTAL_SCORE: 26
POURING: (3) MODERATELY ABNORMAL. MUST USE TWO HANDS OR USES OTHER STRATEGIES TO AVOID SPILLING.
FEEDING_WITH_A_SPOON: (1) SLIGHTLY ABNORMAL. TREMOR IS PRESENT BUT DOES NOT INTERFERE WITH FEEDING WITH A SPOON.
SPEAKING: (1) SLIGHT VOICE TREMULOUSNESS, ONLY WHEN "NERVOUS".
SOCIAL_IMPACT: (2) FEELS EMBARRASSED BY TREMOR IN SOME SOCIAL SITUATIONS OR PROFESSIONAL MEETINGS.
DRINKING_FROM_A_GLASS: (3) MODERATELY ABNORMAL. SPILLS A LOT OR CHANGES STRATEGY TO COMPLETE TASK SUCH AS USING TWO HANDS OR LEANING OVER.
DRESS: (1) SLIGHTLY ABNORMAL. TREMOR IS PRESENT BUT DOES NOT INTERFERE WITH DRESSING.
CARRYING_FOOD_TRAYS_PLATES_OR_SIMILAR_ITEMS: (3) MODERATELY ABNORMAL. USES STRATEGIES SUCH AS HOLDING TIGHTLY AGAINST BODY TO CARRY.
OVERALL_DISABILITY_WITH_THE_MOST_AFFECTED_TASK: (3) MODERATELY ABNORMAL. CAN DO TASK BUT MUST USE STRATEGIES.
USING_KEYS: (2) MILDLY ABNORMAL. COMMONLY MISSES TARGET BUT STILL ROUTINELY PUTS KEY IN LOCK WITH ONE HAND.
WRITING: (3) MODERATELY ABNORMAL. CANNOT WRITE WITHOUT USING STRATEGIES SUCH AS HOLDING THE WRITING HAND WITH THE OTHER HAND, HOLDING PEN DIFFERENTLY OR USING LARGE PEN.
OVERALL_DISABILITY_WITH_THE_MOST_AFFECTED_TASK: WRITING

## 2021-07-02 ASSESSMENT — PAIN SCALES - GENERAL: PAINLEVEL: NO PAIN (0)

## 2021-07-02 NOTE — LETTER
7/2/2021       RE: Niranjan Dodge  [address] [place] [address]     Dear Colleague,    Thank you for referring your patient, Niranjan Dodge, to the [facility] [place] at [facility]. Please see a copy of my visit note below.      ASSESSMENT:    Essential Tremor:  Patient has about a 13 year history of ET.  Today's exam shows mild tremor in bilateral hands.  He also has voice tremors.  No tremors as rest, in his head, trunk, or lower extremities.      PLAN:    __ I discussed medications used to treat ET such as Beta-blockers and Anticonvulsants. I informed pt that most pts try at least one medication before going for brain surgery. He is not interested to try medication.  He would like to see if insurance covers his surgery if approved by the DBS Consensus team before considering medications trial.    We had a lengthy discussion about DBS surgery, risks, & benefits. We informed pt the possibility of 1 - 2 % significant side effect per lead. We discussed the risks of infection, bleeding, stroke, & other potential complications. We discussed appropriate candidates for DBS. Patient's questions were answered.     __ We discussed the work-up for the surgery, which includes tremor testing with videotaping, neuropsychological evaluation, and brain MRI. Once these are completed, the we'll discuss if pt is a good candidate based on all the work-up.     __  Since his tremors are not severe enough to warrant surgical intervention, I proposed just going through the tremor assessment with videotaping and having the DBS team discuss him before going forward with the rest of his workup.    __  He was provided with online material to review about DBS. See AVS for details.    __ Discussed about Yuliana Trio device, anon-invasive procedure for tremor treatment. Information provided.    __  He'll return to the clinic for tremor rating and  videotaping.  Laz Antonio CMA notified to schedule the appointment.         [facility] DISORD[facility] CLINIC         REFERRING PROVIDER: Self Referred.     PATIENT: Niranjan Dodge    : 1945    [place]: 2021    REASON FOR VISIT: Consult for Deep Brain Stimulation (DBS) for the treatment of essential tremor (ET).    HPI: Mr. Niranjan Dodge is a 76 year old right - handed male who came to the [facility] neurology clinic accompanied by his longtime female partner to discuss DBS for the treatment of ET.    He was diagnosed with ET in  by Dr. Zane Jarrell at [facility], in [place].  He has brought a copy of the note, which was sent to be scanned.  His tremor started two years prior to diagnosis affecting mostly the right hand.  He has never been on any medication to treat medication. He didn't want to try medication due to the potential side effects. Also, since the medications don't suppress tremors adequately, he opted not to try them.  He has a few friends with ET how have been on medication and he didn't hear anything good about the medications.     He reports increased tremor due to major stressors that he is experiencing this summer. In May 2021, his 40 year old son . [place], [place] sold the [place] house they had for 36 years [place] of [place]. In the last 2 months, he has been unpacking. He also reports having relational difficulty with his partner.    He is interested to have DBS to improve his writing. He has to anchor right elbow when writing.    ROS for TREMOR: -   Tremor location:   Voice and both hands; Rt > Lt.   Exacerbating factors: Stress.  Relieving factors: None noted. (Less stress).  Does alcohol relieve symptoms: He is not sure.  Change in handwriting: Illegible. Denies micrographia.  Family Hx of tremor: Yes. In his mother and sister.  Resting tremor: No.  Bradykinesia: No.  Rigidity: Age related stiffness after playing golf in his  "neck.  Gait problem: \"It's fine.\"  Falls: No.   Pain: Neck and upper right shoulder aches.  He has had steroid injections, which helped. He does some exercise.   Numbness & Tingling: Occasionally tips of right fingers.     Activities of Daily Living Subscale: (Maximum Score = 48)  (0=Normal 1=Slightly abnormal 2=Mildly 3=Moderately 4= Severely)  ADL Sub-Scale 7/2/2021   1. Speaking 1   2. Feeding with a spoon 1   3. Drinking from a glass 3   4. Hygiene 3   5. Dressing 1   6. Pouring 3   7. Carrying food trays, plates or similar items 3   8. Using keys 2   9. Writing 3   10. Working 1   11. Overall disability with the most affected task 3   Name of most affected task: Writing   12. Social impact 2   ADL TOTAL 26       Records Reviewed:  Note from [facility]  Neurosurgery Note from 9/24/2020  Annual exam from 9/20/2019      MEDICATIONS:  Outpatient Medications Marked as Taking for the 7/2/21 encounter (Office Visit) with Angelita Muñoz APRN CNP   Medication Sig     aspirin 81 mg chewable tablet [ASPIRIN 81 MG CHEWABLE TABLET] Chew 81 mg daily.     atorvastatin (LIPITOR) 10 MG tablet [ATORVASTATIN (LIPITOR) 10 MG TABLET] Take 1 tablet (10 mg total) by mouth daily.     sildenafil (REVATIO) 20 mg tablet [SILDENAFIL (REVATIO) 20 MG TABLET] Take 3 tablets (60 mg total) by mouth daily as needed.       ALLERGIES: Patient has no known allergies.    PMH:    Past Medical History:   Diagnosis Date     BCC (basal cell carcinoma), face      Colonic fistula      Gastroesophageal reflux disease without esophagitis      Hypercholesterolemia      Other male erectile dysfunction      Schwannoma - Base of throat/tongue      Sleep apnea        PSH:   Past Surgical History:   Procedure Laterality Date     Colon Surgery due to Fistula       Left Knee Arthroscopic Surgery Left      Schwannoma Removal      Inferior aspect of the tongue     SKIN CANCER EXCISION Left     To remove basal cell CA from left lower eyelid " "      FH: Blood Disease in his sister; Cerebrovascular Disease in his mother; Emphysema in his father; Tremor in his mother and sister.    SH:     Social History     Socioeconomic History     Marital status: Single     Spouse name: Not on file     Number of children: 2   Tobacco Use     Smoking status: Never Smoker     Smokeless tobacco: Never Used   Substance and Sexual Activity     Alcohol use: Yes     Alcohol/week: 14.0 standard drinks     Types: 14 Shots of liquor per week     Frequency: 4 or more times a week     Drinks per session: 1 or 2     Binge frequency: Never     Comment: Drinks \"2 scotch per day.\" His partner said he has 3 per day.     Drug use: Never   Social History Narrative    Has a female partner, who is also a retired professor.  Has 2 children.  Retired in 2011.  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       PHYSICAL EXAM:     Vital Signs:  Blood pressure 128/59, pulse 60, resp. rate 16, SpO2 100 %.     General: Mr. Dodge is a pleasant  male who is well-groomed and well-developed sitting comfortably in the exam room without any distress. Affect is appropriate. Lungs: CTA bilaterally without any adventitious sounds.  CV: S1, S2, with a regular heart rate & rhythm, and no audible murmur. Abdomen: Soft and non-tender.  BS are positive.     Mental Status: Alert and oriented.  Provided detailed medical Hx without difficulty.     Cranial Nerve Exam: PERRLA. Visual fields are full to confrontation. EOMs are intact. No nystagmus. Face is symmetric. Has normal facial expression. Gross hearing is intact. Tongue protrudes midline. Sternocleidomastoid and trapezius muscles are full bilaterally.    Strength: Has 5/5 strength in the upper and lower extremities and proximal & distal muscle groups.    Cerebellar exam: Finger-to-nose exam shows no dysmetria. Heel-to-shin exam was normal.     Reflexes: Has symmetrical 3+ reflexes in the biceps, triceps, brachial radialis, " patellar, & achilles.     TRG Essential Tremor Rating Assessment Scale (TETRAS) V 3.1  Performance Subscale (Maximum score = 64)  (0=No tremor 1=Slight 2=Mild 3=Moderate 4= Severe)       Motor (Performance) Sub-Scale 7/2/2021   Assessment Time 8:54 AM   Medication Off   DBS - Right Brain None   DBS - Left Brain None   Head 0   Face & Jaw 0   Voice 2   Outstretched - RIGHT 2   Outstretched - LEFT 0   Wingbeating - RIGHT 0.5   Wingbeating - LEFT 0.5   Kinetic - RIGHT 0.5   Kinetic - LEFT 0.5   Lower Limb - RIGHT 0   Lower Limb - LEFT 0   Lower Limb (Max) 0   Spiral - RIGHT 0.5   Spiral - LEFT 1   Handwriting 0.5   Dot approx - RIGHT 1.5   Dot approx - LEFT 1   Trunk (Standing) 0   Total Right 5   Total Left 3   Axial 2   TOTAL 10.5     Today I spent 110 minutes caring for the patient. Time was spent with reviewing records, meeting with the patient, answering questions, examining, and documentation.    RUPALI De,  CNP  [facility]        Again, thank you for allowing me to participate in the care of your patient.      Sincerely,    RUPALI Reyna CNP

## 2021-07-02 NOTE — NURSING NOTE
Chief Complaint   Patient presents with     Consult     P NEW MOVEMENT DISORDER     Christ Valverde

## 2021-07-02 NOTE — PATIENT INSTRUCTIONS
Dear Devora Niranjan Dodge,    Thank you for coming today.  During your visit, we have discussed the following:     __ We discussed about DBS procedure, risks, & benefits. We informed pt the possibility of 1 - 2 % significant side effect per lead. We discussed the risks of infection, bleeding, stroke, & other potential complications. We discussed appropriate candidates for DBS.     __ We discussed the work-up for the surgery, which includes tremor evaluation with videotaping, neuropsychological evaluation, and brain MRI.     __ Because your tremor is not worse to require surgery, it would be good to do the videotaping first, then we'll go from there.     __   You may review the Deep Brain Stimulation class videos and handbook below: -       DBS Class Video Part 1 (33 minutes) - Explanation of what DBS is and the work up appointments       DBS Class Video Part 2 (22 minutes) - In depth discussion of the DBS surgery       DBS Class Video Part 3 (15 minutes) - We have a third neurosurgeon, Dr. James Olivia, who is not pictured in the video but, like his colleagues, is an exceptional surgeon.       DBS Class Video Part 4 (10 minutes) - Research opportunities at the [facility] related to Deep Brain Stimulation      DBS Handbook    __  Laz Antonio CMA will call you to schedule the tremor evaluation and videotaping.  You may return sooner as needed.    __  You can look into the Yuliana Trio watch, a device worn around the wrist. It works by stimulating the radial and median nerves to disrupt the signals from the brain causing your tremor. A stimulation sensation lasts 40 minutes and can be done multiple times throughout the day. On average after a stimulation sensation people noticed a reduction of tremor for 90 minutes. The studies showed that 64% of people had a meaningful reduction of tremor with the Yuliana Trio watch and 68% of people were better able to carry out activities of daily living (writing,  drinking/eating without spilling). The most common side effects was mild to moderate skin irritation (electrical stimulation burns, redness or itching).    Prior to your appointment measure your wrist on the side that you would like to start Yuliana Trio. This will determine what size band you would need for the device. Yuliana Trio is contraindicated in people who have implanted devices such as a pacemaker.     - $1995.00 (includes everything - taxes) + $99 per month band subscription  - Over 12 month $265.25  interest free - includes the band subscription  - 2 year warranty on watch and docking station  - 60 day return policy - get full refund minus $99.00 return fee  - Band is a replaceable component - every 90 days   - 3 sizes of bands  - $99 month for bands - duration of time of using therapy  - Bands are mailed every 90 days     [email or web address]  [phone]    For questions, you may send us a [employer] message or call [phone]    Fax number: [phone]    RUPALI De, CNP  [facility]

## 2021-07-02 NOTE — PROGRESS NOTES
ASSESSMENT:    Essential Tremor:  Patient has about a 13 year history of ET.  Today's exam shows mild tremor in bilateral hands.  He also has voice tremors.  No tremors as rest, in his head, trunk, or lower extremities.      PLAN:    __ I discussed medications used to treat ET such as Beta-blockers and Anticonvulsants. I informed pt that most pts try at least one medication before going for brain surgery. He is not interested to try medication.  He would like to see if insurance covers his surgery if approved by the DBS Consensus team before considering medications trial.    We had a lengthy discussion about DBS surgery, risks, & benefits. We informed pt the possibility of 1 - 2 % significant side effect per lead. We discussed the risks of infection, bleeding, stroke, & other potential complications. We discussed appropriate candidates for DBS. Patient's questions were answered.     __ We discussed the work-up for the surgery, which includes tremor testing with videotaping, neuropsychological evaluation, and brain MRI. Once these are completed, the we'll discuss if pt is a good candidate based on all the work-up.     __  Since his tremors are not severe enough to warrant surgical intervention, I proposed just going through the tremor assessment with videotaping and having the DBS team discuss him before going forward with the rest of his workup.    __  He was provided with online material to review about DBS. See AVS for details.    __ Discussed about Yuliana Trio device, anon-invasive procedure for tremor treatment. Information provided.    __  He'll return to the clinic for tremor rating and videotaping.  Laz Antonio [facility] notified to schedule the appointment.         [facility]         REFERRING PROVIDER: Self Referred.     PATIENT: Niranjan Dodge    : 1945    [place]: 2021    REASON FOR VISIT: Consult for Deep Brain Stimulation (DBS) for the treatment of essential tremor (ET).    HPI:  "Mr. Niranjan Dodge is a 76 year old right - handed male who came to the [facility] neurology clinic accompanied by his longtime female partner to discuss DBS for the treatment of ET.    He was diagnosed with ET in  by Dr. Zane Jarrell at [facility], in [place].  He has brought a copy of the note, which was sent to be scanned.  His tremor started two years prior to diagnosis affecting mostly the right hand.  He has never been on any medication to treat medication. He didn't want to try medication due to the potential side effects. Also, since the medications don't suppress tremors adequately, he opted not to try them.  He has a few friends with ET how have been on medication and he didn't hear anything good about the medications.     He reports increased tremor due to major stressors that he is experiencing this summer. In May 2021, his 40 year old son . [place] didn[place], he sold the lake house they had for 36 years [place] [facility]. In the last 2 months, he has been unpacking. He also reports having relational difficulty with his partner.    He is interested to have DBS to improve his writing. He has to anchor right elbow when writing.    ROS for TREMOR: -   Tremor location:   Voice and both hands; Rt > Lt.   Exacerbating factors: Stress.  Relieving factors: None noted. (Less stress).  Does alcohol relieve symptoms: He is not sure.  Change in handwriting: Illegible. Denies micrographia.  Family Hx of tremor: Yes. In his mother and sister.  Resting tremor: No.  Bradykinesia: No.  Rigidity: Age related stiffness after playing golf in his neck.  Gait problem: \"It's fine.\"  Falls: No.   Pain: Neck and upper right shoulder aches.  He has had steroid injections, which helped. He does some exercise.   Numbness & Tingling: Occasionally tips of right fingers.     Activities of Daily Living Subscale: (Maximum Score = 48)  (0=Normal 1=Slightly abnormal 2=Mildly " 3=Moderately 4= Severely)  ADL Sub-Scale 7/2/2021   1. Speaking 1   2. Feeding with a spoon 1   3. Drinking from a glass 3   4. Hygiene 3   5. Dressing 1   6. Pouring 3   7. Carrying food trays, plates or similar items 3   8. Using keys 2   9. Writing 3   10. Working 1   11. Overall disability with the most affected task 3   Name of most affected task: Writing   12. Social impact 2   ADL TOTAL 26       Records Reviewed:  Note from [facility]  Neurosurgery Note from 9/24/2020  Annual exam from 9/20/2019      MEDICATIONS:  Outpatient Medications Marked as Taking for the 7/2/21 encounter (Office Visit) with Angelita Muñoz APRN CNP   Medication Sig     aspirin 81 mg chewable tablet [ASPIRIN 81 MG CHEWABLE TABLET] Chew 81 mg daily.     atorvastatin (LIPITOR) 10 MG tablet [ATORVASTATIN (LIPITOR) 10 MG TABLET] Take 1 tablet (10 mg total) by mouth daily.     sildenafil (REVATIO) 20 mg tablet [SILDENAFIL (REVATIO) 20 MG TABLET] Take 3 tablets (60 mg total) by mouth daily as needed.       ALLERGIES: Patient has no known allergies.    PMH:    Past Medical History:   Diagnosis Date     BCC (basal cell carcinoma), face      Colonic fistula      Gastroesophageal reflux disease without esophagitis      Hypercholesterolemia      Other male erectile dysfunction      Schwannoma - Base of throat/tongue      Sleep apnea        PSH:   Past Surgical History:   Procedure Laterality Date     Colon Surgery due to Fistula       Left Knee Arthroscopic Surgery Left      Schwannoma Removal      Inferior aspect of the tongue     SKIN CANCER EXCISION Left     To remove basal cell CA from left lower eyelid       FH: Blood Disease in his sister; Cerebrovascular Disease in his mother; Emphysema in his father; Tremor in his mother and sister.    SH:     Social History     Socioeconomic History     Marital status: Single     Spouse name: Not on file     Number of children: 2   Tobacco Use     Smoking status: Never Smoker      "Smokeless tobacco: Never Used   Substance and Sexual Activity     Alcohol use: Yes     Alcohol/week: 14.0 standard drinks     Types: 14 Shots of liquor per week     Frequency: 4 or more times a week     Drinks per session: 1 or 2     Binge frequency: Never     Comment: Drinks \"2 scotch per day.\" His partner said he has 3 per day.     Drug use: Never   Social History Narrative    Has a female partner, who is also a retired professor.  Has 2 children.  Retired in 2011.  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       PHYSICAL EXAM:     Vital Signs:  Blood pressure 128/59, pulse 60, resp. rate 16, SpO2 100 %.     General: Mr. Dodge is a pleasant  male who is well-groomed and well-developed sitting comfortably in the exam room without any distress. Affect is appropriate. Lungs: CTA bilaterally without any adventitious sounds.  CV: S1, S2, with a regular heart rate & rhythm, and no audible murmur. Abdomen: Soft and non-tender.  BS are positive.     Mental Status: Alert and oriented.  Provided detailed medical Hx without difficulty.     Cranial Nerve Exam: PERRLA. Visual fields are full to confrontation. EOMs are intact. No nystagmus. Face is symmetric. Has normal facial expression. Gross hearing is intact. Tongue protrudes midline. Sternocleidomastoid and trapezius muscles are full bilaterally.    Strength: Has 5/5 strength in the upper and lower extremities and proximal & distal muscle groups.    Cerebellar exam: Finger-to-nose exam shows no dysmetria. Heel-to-shin exam was normal.     Reflexes: Has symmetrical 3+ reflexes in the biceps, triceps, brachial radialis, patellar, & achilles.     TRG Essential Tremor Rating Assessment Scale (TETRAS) V 3.1  Performance Subscale (Maximum score = 64)  (0=No tremor 1=Slight 2=Mild 3=Moderate 4= Severe)       Motor (Performance) Sub-Scale 7/2/2021   Assessment Time 8:54 AM   Medication Off   DBS - Right Brain None   DBS - Left Brain None "   Head 0   Face & Jaw 0   Voice 2   Outstretched - RIGHT 2   Outstretched - LEFT 0   Wingbeating - RIGHT 0.5   Wingbeating - LEFT 0.5   Kinetic - RIGHT 0.5   Kinetic - LEFT 0.5   Lower Limb - RIGHT 0   Lower Limb - LEFT 0   Lower Limb (Max) 0   Spiral - RIGHT 0.5   Spiral - LEFT 1   Handwriting 0.5   Dot approx - RIGHT 1.5   Dot approx - LEFT 1   Trunk (Standing) 0   Total Right 5   Total Left 3   Axial 2   TOTAL 10.5     Today I spent 110 minutes caring for the patient. Time was spent with reviewing records, meeting with the patient, answering questions, examining, and documentation.    Damaris Muñoz APRN,  CNP  [facility]

## 2021-07-06 ENCOUNTER — TELEPHONE (OUTPATIENT)
Dept: NEUROLOGY | Facility: CLINIC | Age: 76
End: 2021-07-06

## 2021-07-06 NOTE — TELEPHONE ENCOUNTER
----- Message from RUPALI Barahona CNP sent at 7/6/2021  9:34 AM CDT -----  Regarding: DBS Workup -- Only Tremor Assessment  Hi Laz,    I saw Mr. Dodge on Friday.  I didn't want to make him go through the whole DBS workup . . . . See my assessment below: -    __  Since his tremors are not severe enough to warrant surgical intervention, I proposed just going through the tremor assessment with videotaping and having the DBS team discuss him before going forward with the rest of his workup.    Please schedule him for tremor rating/videotaping with me, then we can present him to our team to see if he should go through the rest of the DBS workup. Thank you.  Damaris

## 2021-07-06 NOTE — TELEPHONE ENCOUNTER
The patient was scheduled for his rating scales appointment on 7/27/21 at 8:30 AM with Damaris Muñoz CNP.

## 2021-07-27 ENCOUNTER — OFFICE VISIT (OUTPATIENT)
Dept: NEUROLOGY | Facility: CLINIC | Age: 76
End: 2021-07-27
Payer: MEDICARE

## 2021-07-27 VITALS
RESPIRATION RATE: 16 BRPM | HEART RATE: 62 BPM | DIASTOLIC BLOOD PRESSURE: 78 MMHG | OXYGEN SATURATION: 98 % | SYSTOLIC BLOOD PRESSURE: 132 MMHG

## 2021-07-27 DIAGNOSIS — G25.0 ESSENTIAL TREMOR: Primary | ICD-10-CM

## 2021-07-27 PROCEDURE — 99215 OFFICE O/P EST HI 40 MIN: CPT | Performed by: NURSE PRACTITIONER

## 2021-07-27 PROCEDURE — 99417 PROLNG OP E/M EACH 15 MIN: CPT | Performed by: NURSE PRACTITIONER

## 2021-07-27 ASSESSMENT — ACTIVITIES OF DAILY LIVING (ADL)
DRINKING_FROM_A_GLASS: (2) MILDLY ABNORMAL. SPILLS A LITTLE.
POURING: (2) MILDLY ABNORMAL. MUST BE VERY CAREFUL TO AVOID SPILLING BUT MAY SPILL OCCASIONALLY.
USING_KEYS: (1) SLIGHTLY ABNORMAL. TREMOR IS PRESENT BUT CAN INSERT KEY WITH ONE HAND WITHOUT DIFFICULTY.
DRESS: (1) SLIGHTLY ABNORMAL. TREMOR IS PRESENT BUT DOES NOT INTERFERE WITH DRESSING.
SOCIAL_IMPACT: (2) FEELS EMBARRASSED BY TREMOR IN SOME SOCIAL SITUATIONS OR PROFESSIONAL MEETINGS.
FEEDING_WITH_A_SPOON: (1) SLIGHTLY ABNORMAL. TREMOR IS PRESENT BUT DOES NOT INTERFERE WITH FEEDING WITH A SPOON.
SPEAKING: (2) MILD VOICE TREMOR. ALL WORDS EASILY UNDERSTOOD.
WRITING: (2) MILDLY ABNORMAL. DIFFICULTY WRITING DUE TO THE TREMOR.
CARRYING_FOOD_TRAYS_PLATES_OR_SIMILAR_ITEMS: (1) SLIGHTLY ABNORMAL. TREMOR IS PRESENT BUT DOES NOT INTERFERE WITH CARRYING FOOD TRAYS, PLATES OR SIMILAR ITEMS.
TOTAL_SCORE: 19
OVERALL_DISABILITY_WITH_THE_MOST_AFFECTED_TASK: WRITING
WORKING: (1) SLIGHTLY ABNORMAL. TREMOR IS PRESENT BUT DOES NOT AFFECT PERFORMANCE AT WORK OR AT HOME.
OVERALL_DISABILITY_WITH_THE_MOST_AFFECTED_TASK: (3) MODERATELY ABNORMAL. CAN DO TASK BUT MUST USE STRATEGIES.
HYGIENE: (1) SLIGHTLY ABNORMAL. TREMOR IS PRESENT BUT DOES NOT INTERFERE WITH HYGIENE.

## 2021-07-27 ASSESSMENT — PAIN SCALES - GENERAL: PAINLEVEL: NO PAIN (0)

## 2021-07-27 NOTE — NURSING NOTE
Chief Complaint   Patient presents with     RECHECK     [facility] RETURN MOTOR TESTING RATING SCALES        Jovani Damon, EMT

## 2021-07-27 NOTE — PATIENT INSTRUCTIONS
Dear  Niranjan EASTON Dodge,    Thank you for coming today.  During your visit, we have discussed the following:     __ We will inform you after we review your video and discuss the severity of your tremors. The DBS Team will decide if surgical procedure is appropriate for you.    For questions, you may send us a [place] message or call [phone]    Fax number: [phone]    RUPALI De, CNP  [facility]       No

## 2021-07-30 ENCOUNTER — TELEPHONE (OUTPATIENT)
Dept: NEUROLOGY | Facility: CLINIC | Age: 76
End: 2021-07-30

## 2021-07-30 ENCOUNTER — MYC MEDICAL ADVICE (OUTPATIENT)
Dept: NEUROLOGY | Facility: CLINIC | Age: 76
End: 2021-07-30

## 2021-07-30 DIAGNOSIS — R27.8 WORSENED HANDWRITING: Primary | ICD-10-CM

## 2021-07-30 DIAGNOSIS — G25.0 ESSENTIAL TREMOR: ICD-10-CM

## 2021-07-30 NOTE — TELEPHONE ENCOUNTER
From 7/29/21 Consensus:    1. Not a candidate because risk vs benefit  2. This level of tremor not sure can close the gap and be where he wants to be   3. Not seeing symptoms severe enough to warrant surgery, we will continue to monitor over time.  4. Damaris to offer OT    Rylie to call.  ----------------------  Left voice mail that I was sending detailed MyChart and not to hesitate to call or send MyChart in return.    MyChart message sent.

## 2021-08-27 NOTE — CONFIDENTIAL NOTE
Niranjan stated his biggest complaint is writing. He reports Damaris discussed a device (yusuf trio) with him that is expensive but may help with writing. I discussed Yusuf trio with him briefly on what it looks like and how it works. A stimulation sensation is 40 minutes to provide about 90 minutes of tremor relief. This therapy is however expensive. I discussed occupational therapy being able to assess his issues with writing and recommend an adaptive writing device to help. There are numerous devices (weighted pens, pens requiring different grasps etc.). Im not sure if this is one visit or multiple visits - he will ask the schedulers once they call him. He will call or write us if he has further questions. I will have Damaris place the occupational therapy referral. He would like to do this through [facility].

## 2021-08-27 NOTE — TELEPHONE ENCOUNTER
[facility] Call Center    Phone Message    May a detailed message be left on voicemail: yes     Reason for Call: Other: Pt called regarding mesage from Rylie regarding OT.      Pt requests a call back from Rylie or Damaris to discuss further.    Action Taken: Message routed to:  [facility] Neurology    Travel Screening: Not Applicable

## 2021-09-17 ENCOUNTER — HOSPITAL ENCOUNTER (OUTPATIENT)
Dept: OCCUPATIONAL THERAPY | Facility: CLINIC | Age: 76
Setting detail: THERAPIES SERIES
End: 2021-09-17
Attending: NURSE PRACTITIONER
Payer: MEDICARE

## 2021-09-17 DIAGNOSIS — G25.0 ESSENTIAL TREMOR: ICD-10-CM

## 2021-09-17 DIAGNOSIS — R27.8 WORSENED HANDWRITING: ICD-10-CM

## 2021-09-17 PROCEDURE — 97165 OT EVAL LOW COMPLEX 30 MIN: CPT | Mod: GO

## 2021-09-17 PROCEDURE — 97535 SELF CARE MNGMENT TRAINING: CPT | Mod: GO

## 2021-09-17 NOTE — PROGRESS NOTES
"   21 0900   Quick Adds   Quick Adds Certification   General Information   Start Of Care Date 21   Referring Physician Angelita Muñoz APRN CNP   Orders Evaluate and treat as indicated   Orders Date 21   Medical Diagnosis Essential Tremor   Onset of Illness/Injury or Date of Surgery 21  (date of order; dx in , progressed over time)   Precautions/Limitations No known precautions/limitations   Surgical/Medical History Reviewed Yes   Additional Occupational Profile Info/Pertinent History of Current Problem Mr. Niranjan Dodge is a 76 year old right - handed  male who presents to OP OT evaluation for handwriting adaptations. He was diagnosed with ET in  by Dr. Zane Jarrell at [facility], in [place].  His tremor started two years prior to diagnosis affecting mostly the right hand.  He has never been on any medication to treat condition. Niranjan stated his biggest complaint is writing and was recently evaluated for potential DBS treatment. He currently anchors his right elbow to write. Pt reports that his son  and he sold the lake house he has had for years over this past summer which he reports \"was quite traumatic and stressful\" which he feels has contributed to the worsening of his tremor.   Comments/Observations Pt presents to session with wife, Nuria.   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 Handwriting skills very important to him.   Current Living Environment House  (with spouse)   Prior Level - Transfers Independent   Prior Level - Ambulation Independent   Prior Level - ADLS Independent   Prior Responsibilities - IADL Meal Preparation;Housekeeping;Laundry;Shopping;Yardwork;Medication management;Finances;Driving   Prior Level Comments IND with ADLs/IADLs   Pain   Patient currently in " pain No   Fall Risk Screen   Fall screen completed by OT   Have you fallen 2 or more times in the past year? No   Have you fallen and had an injury in the past year? No   Abuse Screen (yes response referral indicated)   Feels Unsafe at Home or Work/School no   Feels Threatened by Someone no   Does Anyone Try to Keep You From Having Contact with Others or Doing Things Outside Your Home? no   Physical Signs of Abuse Present no   Cognitive Status Examination   Orientation Orientation to person, place and time   Level of Consciousness Alert   Follows Commands and Answers Questions 100% of the time;Able to follow single-step instructions   Personal Safety and Judgment Intact   Memory Intact  (Pt denies difficulty with memory.)   Attention No deficits were identified   Cognitive Comment Per observation of performance and pt report, cognition WFL. Not further assessed this date 2/2 time constraints and focus on handwriting adaptations for today's session.   Visual Perception   Visual Perception Wears glasses   Hand Strength   Hand Dominance Right   Coordination   Upper Extremity Coordination Right UE impaired;Left UE impaired   Left Hand, Nine Hole Peg Test (seconds) 30   Right Hand, Nine Hole Peg Test (seconds) 34   Functional Limitations Decreased speed;Fine motor ADL performance impaired   Coordination Comments On 9 hole peg test, L hand 1-2 SD below the norm and R hand greater than 2 SD below the norm (BNL). UE tremor with R > L and significant difficulty with fine motor tasks, particularly handwriting.   Functional Mobility   Ambulation IND   Transfer Skill   Level of Clear: Transfers independent   Bathing   Level of Clear - Bathing other (see comments)  (Mod I, increased time)   Upper Body Dressing   Level of Clear: Dress Upper Body other (see comments)  (Mod I, increased time)   Lower Body Dressing   Level of Clear: Dress Lower Body other (see comments)  (Mod I, increased time)    Toileting   Level of [place]: Toilet independent   Grooming   Level of [place]: Grooming other (see comments)  (Mod I, increased time)   Eating/Self-Feeding   Level of [place]: Eating other (see comments)  (Mod I, increased time)   Eating/Self Feeding Comments Denies spilling food when bringing utensil to mouth with feeding.   Instrumental Activities of Daily Living Assessment   IADL Assessment/Observations Pt reports IND with ADLs/IADLs, no significant concerns with these tasks aside from handwriting at this time.   Planned Therapy Interventions   Planned Therapy Interventions ADL training;IADL training;Coordination training;Self care/Home management    OT Goal 1   Goal Identifier FMC/Handwriting   Goal Description Pt to demonstrate 3 adapative or compensatory strategies to promote IND with fine motor ADLs (ex. Feeding, handwriting).   Target Date 09/17/21   Date Met 09/17/21   Clinical Impression   Criteria for Skilled Therapeutic Interventions Met Yes, treatment indicated  (1 time evaluation and treatment)   OT Diagnosis Decreased ease of ADL/IADL completion   Influenced by the following impairments decreased coordination   Assessment of Occupational Performance 1-3 Performance Deficits   Identified Performance Deficits handwriting, typing, fine motor ADLs   Clinical Decision Making (Complexity) Low complexity   Therapy Frequency 1x/week   Predicted Duration of Therapy Intervention (days/wks) 1 week  (1 time evaluation and treatment)   Risks and Benefits of Treatment have been explained. Yes   Patient, Family & other staff in agreement with plan of care Yes   Clinical Impression Comments Pt will benefit from one-time only skilled OT services to promote IND with ADLs/IADLs   Education Assessment   Barriers To Learning No Barriers   Preferred Learning Style Listening;Reading;Pictures/video;Demonstration   Therapy Certification   Certification date from 09/17/21   Certification date to 09/17/21    Certification I certify the need for these services furnished under this plan of treatment and while under my care.  (Physician co-signature of this document indicates review and certification of the therapy plan)   Total Evaluation Time   OT Snow Low Complexity Minutes (20649) 10     Patty Oscar, OTR/L

## 2021-09-17 NOTE — PROGRESS NOTES
[facility]          OUTPATIENT OCCUPATIONAL THERAPY  EVALUATION  PLAN OF TREATMENT FOR OUTPATIENT REHABILITATION  (COMPLETE FOR INITIAL CLAIMS ONLY)  Patient's Last Name, First Name, M.I.  YOB: 1945  Niranjan Dodge                        Provider's Name  [facility] Medical Record No.  [id number]                               Onset Date:     08/27/21 (date of order; dx in 2010, progressed over time)   Start of Care Date:     09/17/21   Type:     ___PT   _X_OT   ___SLP Medical Diagnosis:     Essential Tremor                          OT Diagnosis:     Decreased ease of ADL/IADL completion Visits from SOC:  1   _________________________________________________________________________________  Plan of Treatment/Functional Goals:  ADL training, IADL training, Coordination training, Self care/Home management                    Goals  Goal Identifier: FMC/Handwriting  Goal Description: Pt to demonstrate 3 adapative or compensatory strategies to promote IND with fine motor ADLs (ex. Feeding, handwriting).  Target Date: 09/17/21  Date Met: 09/17/21                             Therapy Frequency: 1x/week     Predicted Duration of Therapy Intervention (days/wks): 1 week (1 time evaluation and treatment)  Patty Oscar OTR/L         I CERTIFY THE NEED FOR THESE SERVICES FURNISHED UNDER        THIS PLAN OF TREATMENT AND WHILE UNDER MY CARE     (Physician co-signature of this document indicates review and certification of the therapy plan).                 ,    Certification date from: 09/17/21, Certification date to: 09/17/21               Referring Physician: Angelita Muñoz APRN CNP     Initial Assessment        See Epic Evaluation      Start Of Care Date: 09/17/21

## 2021-10-01 ENCOUNTER — OFFICE VISIT (OUTPATIENT)
Dept: INTERNAL MEDICINE | Facility: CLINIC | Age: 76
End: 2021-10-01
Payer: MEDICARE

## 2021-10-01 VITALS
SYSTOLIC BLOOD PRESSURE: 108 MMHG | WEIGHT: 187 LBS | OXYGEN SATURATION: 99 % | DIASTOLIC BLOOD PRESSURE: 60 MMHG | HEIGHT: 69 IN | HEART RATE: 61 BPM | BODY MASS INDEX: 27.7 KG/M2

## 2021-10-01 DIAGNOSIS — Z00.00 ROUTINE GENERAL MEDICAL EXAMINATION AT A HEALTH CARE FACILITY: Primary | ICD-10-CM

## 2021-10-01 DIAGNOSIS — G25.0 BENIGN ESSENTIAL TREMOR: ICD-10-CM

## 2021-10-01 DIAGNOSIS — G47.30 SLEEP APNEA, UNSPECIFIED TYPE: ICD-10-CM

## 2021-10-01 DIAGNOSIS — Z12.5 SCREENING PSA (PROSTATE SPECIFIC ANTIGEN): ICD-10-CM

## 2021-10-01 DIAGNOSIS — E78.00 HYPERCHOLESTEROLEMIA: ICD-10-CM

## 2021-10-01 LAB
ALBUMIN SERPL-MCNC: 4.2 G/DL (ref 3.5–5)
ALP SERPL-CCNC: 77 U/L (ref 45–120)
ALT SERPL W P-5'-P-CCNC: 21 U/L (ref 0–45)
ANION GAP SERPL CALCULATED.3IONS-SCNC: 8 MMOL/L (ref 5–18)
AST SERPL W P-5'-P-CCNC: 27 U/L (ref 0–40)
BILIRUB SERPL-MCNC: 2.4 MG/DL (ref 0–1)
BUN SERPL-MCNC: 11 MG/DL (ref 8–28)
CALCIUM SERPL-MCNC: 9.6 MG/DL (ref 8.5–10.5)
CHLORIDE BLD-SCNC: 105 MMOL/L (ref 98–107)
CHOLEST SERPL-MCNC: 193 MG/DL
CO2 SERPL-SCNC: 26 MMOL/L (ref 22–31)
CREAT SERPL-MCNC: 0.77 MG/DL (ref 0.7–1.3)
ERYTHROCYTE [DISTWIDTH] IN BLOOD BY AUTOMATED COUNT: 12.1 % (ref 10–15)
FASTING STATUS PATIENT QL REPORTED: YES
GFR SERPL CREATININE-BSD FRML MDRD: 89 ML/MIN/1.73M2
GLUCOSE BLD-MCNC: 90 MG/DL (ref 70–125)
HCT VFR BLD AUTO: 43.8 % (ref 40–53)
HDLC SERPL-MCNC: 86 MG/DL
HGB BLD-MCNC: 14.8 G/DL (ref 13.3–17.7)
LDLC SERPL CALC-MCNC: 95 MG/DL
MCH RBC QN AUTO: 31.6 PG (ref 26.5–33)
MCHC RBC AUTO-ENTMCNC: 33.8 G/DL (ref 31.5–36.5)
MCV RBC AUTO: 94 FL (ref 78–100)
PLATELET # BLD AUTO: 152 10E3/UL (ref 150–450)
POTASSIUM BLD-SCNC: 4 MMOL/L (ref 3.5–5)
PROT SERPL-MCNC: 7.6 G/DL (ref 6–8)
PSA SERPL-MCNC: 0.37 UG/L (ref 0–6.5)
RBC # BLD AUTO: 4.68 10E6/UL (ref 4.4–5.9)
SODIUM SERPL-SCNC: 139 MMOL/L (ref 136–145)
TRIGL SERPL-MCNC: 60 MG/DL
TSH SERPL DL<=0.005 MIU/L-ACNC: 1.73 UIU/ML (ref 0.3–5)
WBC # BLD AUTO: 5.3 10E3/UL (ref 4–11)

## 2021-10-01 PROCEDURE — G0103 PSA SCREENING: HCPCS | Performed by: INTERNAL MEDICINE

## 2021-10-01 PROCEDURE — 85027 COMPLETE CBC AUTOMATED: CPT | Performed by: INTERNAL MEDICINE

## 2021-10-01 PROCEDURE — 80053 COMPREHEN METABOLIC PANEL: CPT | Performed by: INTERNAL MEDICINE

## 2021-10-01 PROCEDURE — 84443 ASSAY THYROID STIM HORMONE: CPT | Performed by: INTERNAL MEDICINE

## 2021-10-01 PROCEDURE — 80061 LIPID PANEL: CPT | Performed by: INTERNAL MEDICINE

## 2021-10-01 PROCEDURE — 36415 COLL VENOUS BLD VENIPUNCTURE: CPT | Performed by: INTERNAL MEDICINE

## 2021-10-01 PROCEDURE — G0439 PPPS, SUBSEQ VISIT: HCPCS | Performed by: INTERNAL MEDICINE

## 2021-10-01 RX ORDER — PROPRANOLOL HCL 60 MG
60 CAPSULE, EXTENDED RELEASE 24HR ORAL DAILY
Qty: 30 CAPSULE | Refills: 11 | Status: SHIPPED | OUTPATIENT
Start: 2021-10-01 | End: 2021-11-02

## 2021-10-01 ASSESSMENT — ACTIVITIES OF DAILY LIVING (ADL): CURRENT_FUNCTION: NO ASSISTANCE NEEDED

## 2021-10-01 ASSESSMENT — MIFFLIN-ST. JEOR: SCORE: 1565.67

## 2021-10-01 NOTE — PROGRESS NOTES
SUBJECTIVE:   Niranjan Dodge is a 76 year old male who presents for Preventive Visit.      Patient has been advised of split billing requirements and indicates understanding: Yes   Are you in the first 12 months of your Medicare coverage?  No    09/02/2021 Comprehensive Visit Department of Neurology in [place], [place]    [address] [place] [address]    [phone]   Luke Iverson M.B.B.S.    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    Family history is remarkable for tremor in his mother and sister.    Onset of tremor was about 15 years ago, both hands are affected by primarily the right hand. This is an action postural tremor, noted while holding a glass of wine which she needs to study with both hands. Handwriting has become quite difficult and he needs to stabilize the hand and forearm. He initially noted it as difficulty with [place] the airport. Which has progressively worsened. He has been initially evaluated by 2 other neurologists including Dr. Martines in [place]. He carries a diagnosis of essential tremor, symptoms have been mild enough to where no treatment has been tried thus far. Other than writing and holding onto objects, putting for golf has also been affected by the tremor. Golf is something he really enjoys a lot and spends a lot of time doing in senior living.    In terms of other associated symptoms, there is no change in manual dexterity beyond what can be explained by the tremor. No changes in gait pattern, balance, no falls, no dream enactment behavior.    He does not describe any cognitive symptoms beyond what would be  expected for age, certainly no impact on his functional independence.    In 1994, he underwent removal of the uvula for snoring, this did not help snoring but definitely made swallowing very challenging for a few years, he had to relearn how to swallow. Since that time peanuts have been difficult and can cause choking.    Speech has recently become more gravelly and fragmented.    There is no evidence of orthostatic lightheadedness or other dysautonomia.    This past summer 2021 was extremely stressful due to the unexpected passing of their son at age 40 due to heart failure. He also had quite a bit of stress surrounding the sale of his [place]. As would be expected, the tremor has exacerbated in the setting of these issues.    ASSESSMENT / PLAN   #1 Familial essential tremor    We talked about first-line medications including propranolol, primidone and second-line medications such as gabapentin and topiramate.    discussed deep brain stimulation and MRI guided focused ultrasound thalamotomy for medication refractory disabling tremors.     #2 Cognitive symptoms  Bedside cognitive testing was a bit abnormal today. I suspect the cognitive efficiency and performance at this time is markedly confounded by stress associated with recent events. He is functionally independent and really does not report any cognitive issues in day-to-day life.    Sleep apnea  Compliant with CPAP therapy     Hypercholesterolemia  On atorvastatin 10 mg daily  9-   Triglycerides <=149 mg/dL 53     Cholesterol <=199 mg/dL 195     LDL Cholesterol Calculated <=129 mg/dL 94     Direct Measure HDL >=40 mg/dL 90      History of colon polyps  No blood in stool or melena.  Colonoscopy due in 2024 ([facility])    Varicose veins    Earwax    9-   Prostate Specific Antigen Screen 0.0 - 6.5 ng/mL 0.4      Got second dose Pfizer COVID vax March 1, 2021  Already got seasonal flu shot        Immunization History   Administered Date(s)  "Administered     FLU 6-35 months 09/23/2016     Flu, Unspecified 09/09/2020     Influenza (High Dose) 3 valent vaccine 10/05/2013, 09/14/2017, 08/30/2019     Influenza Vaccine, 6+MO IM (QUADRIVALENT W/PRESERVATIVES) 09/20/2012     Pneumo Conj 13-V (2010&after) 06/08/2015     Pneumococcal 23 valent 06/02/2014     Tdap (Adacel,Boostrix) 06/30/2016     Zoster vaccine recombinant adjuvanted (SHINGRIX) 01/02/2020, 05/26/2020     Zoster vaccine, live 05/07/2012           Healthy Habits:     In general, how would you rate your overall health?  Excellent    Frequency of exercise:  2-3 days/week    Duration of exercise:  15-30 minutes    Do you usually eat at least 4 servings of fruit and vegetables a day, include whole grains    & fiber and avoid regularly eating high fat or \"junk\" foods?  Yes    Taking medications regularly:  No    Medication side effects:  None    Ability to successfully perform activities of daily living:  No assistance needed    Home Safety:  No safety concerns identified    Hearing Impairment:  No hearing concerns    In the past 6 months, have you been bothered by leaking of urine? Yes    In general, how would you rate your overall mental or emotional health?  Good      PHQ-2 Total Score: 0    Additional concerns today:  No    Do you feel safe in your environment? Yes    Have you ever done Advance Care Planning? (For example, a Health Directive, POLST, or a discussion with a medical provider or your loved ones about your wishes): Yes, advance care planning is on file.       Fall risk  Fallen 2 or more times in the past year?: No  Any fall with injury in the past year?: No    Cognitive Screening   1) Repeat 3 items (Leader, Season, Table)    2) Clock draw: NORMAL  3) 3 item recall: Recalls 3 objects  Results: 3 items recalled: COGNITIVE IMPAIRMENT LESS LIKELY    Mini-CogTM Copyright YADIRA Richards. Licensed by the author for use in [place] [employer]; reprinted with permission (pamela@.[facility]). All rights " "reserved.      Do you have sleep apnea, excessive snoring or daytime drowsiness?: yes    Reviewed and updated as needed this visit by clinical staff  Tobacco  Allergies  Meds              Reviewed and updated as needed this visit by Provider    Meds             Social History     Tobacco Use     Smoking status: Never Smoker     Smokeless tobacco: Never Used   Substance Use Topics     Alcohol use: Yes     Alcohol/week: 14.0 standard drinks     Types: 14 Shots of liquor per week     Comment: Drinks \"2 scotch per day.\" His partner said he has 3 per day.     If you drink alcohol do you typically have >3 drinks per day or >7 drinks per week? No    Alcohol Use 10/1/2021   Prescreen: >3 drinks/day or >7 drinks/week? Yes   Prescreen: >3 drinks/day or >7 drinks/week? -   AUDIT SCORE  4       Current providers sharing in care for this patient include:   Patient Care Team:  Herberth Duffy MD as PCP - General (Internal Medicine)  Angelita Muñoz APRN CNP as Assigned Neuroscience Provider    The following health maintenance items are reviewed in Epic and correct as of today:  Health Maintenance Due   Topic Date Due     ANNUAL REVIEW OF HM ORDERS  Never done     COVID-19 Vaccine (1) Never done     HEPATITIS C SCREENING  Never done     AORTIC ANEURYSM SCREENING (SYSTEM ASSIGNED)  Never done     FALL RISK ASSESSMENT  09/20/2020       Review of Systems  Constitutional, HEENT, cardiovascular, pulmonary, GI, , musculoskeletal, neuro, skin, endocrine and psych systems are negative, except as otherwise noted.    OBJECTIVE:   /60 (BP Location: Right arm, Patient Position: Sitting, Cuff Size: Adult Regular)   Pulse 61   Ht 1.74 m (5' 8.5\")   Wt 84.8 kg (187 lb)   SpO2 99%   BMI 28.02 kg/m   Estimated body mass index is 28.02 kg/m  as calculated from the following:    Height as of this encounter: 1.74 m (5' 8.5\").    Weight as of this encounter: 84.8 kg (187 lb).  Physical Exam      General: Alert, in no " distress  Skin: No significant lesion seen.  Eyes/nose/throat: Eyes without scleral icterus, eye movements normal, pupils equal and reactive, oropharynx clear  + Earwax occluding right tympanic canal  MSK: Neck with good ROM  Lymphatic: Neck without adenopathy or masses  Endocrine: Thyroid with no nodules to palpation  Pulm: Lungs clear to auscultation bilaterally  Cardiac: Heart with regular rate and rhythm, no murmur or gallop  GI: Abdomen soft, nontender. No palpable enlargement of liver or spleen  MSK: Extremities no tenderness or edema  Neuro: Moves all extremities, without focal weakness  + Mild tremor noted in his right upper extremity, and his handwriting shows the characteristic wavering of essential tremor  Psych: Alert, normal mental status. 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    He is fasting this morning, so we will check a lipid panel, comprehensive metabolic panel, blood cell counts, thyroid cascade, and screening PSA.    Going issue by issue:    Essential tremor, diagnosis confirmed at [facility] Dr. Luke Iverson consultation date September 2, 2021, and he is also had consultations at other major medical centers in the [place] and [place].    Onset of the tremor was approximately 2005, both hands affected but primarily the right hand, which affects his handwriting, painting, ability to hold a drinking glass.  Symptoms have been gradually progressive.  There are no other neurological deficits.    I agree with Dr. Iverson's recommendation to try propranolol as first-line therapy.  I am issuing a prescription for propranolol extended release 60 mg once a day.  I told Mr. Dodge to be on the look out for side effects associated with lowering of blood pressure, namely lightheadedness, dizziness,  fainting.  The first time he tries this medicine, I want him to be in a safe environment, not driving or operating heavy machinery.    Second line medications that Dr. Iverson suggested are Mysoline, gabapentin, and topiramate.  Dr. Iverson also talked about more invasive treatments if medication fails, such as deep brain stimulation and MRI guided focused ultrasound lesioning of the thalamus.    Sleep apnea, compliant with CPAP treatment.  History of a uvulectomy that was done in [place], which was not helpful and actually caused trouble swallowing for couple years.    Hyperlipidemia, no history of cardiovascular events, lipids were beautifully controlled when measured in 2020, on a modest dose of atorvastatin 10 mg a day    History of colon polyps.  Notes from his previous doctors talked about next colonoscopy being due in 2024, but Mr. Rudolph is thinks it may be due sooner than that.  I asked him to call [facility], and ask them when his next exam would be recommended.    Varicose veins, not symptomatically bothersome.    Earwax occluding the right tympanic canal.  I recommended that he use over-the-counter wax dissolving eardrop, example brand Debrox or Murine.    Received second dose of Pfizer COVID-19 vaccine on March 1, 2021, and we can schedule him for his third shot of Pfizer.  He already got his seasonal flu vaccine for 2021.  He is received both pneumococcal vaccines, both shingles vaccines, and is current on tetanus which was administered in 2016    Immunization History   Administered Date(s) Administered     FLU 6-35 months 09/23/2016     Flu, Unspecified 09/09/2020     Influenza (High Dose) 3 valent vaccine 10/05/2013, 09/14/2017, 08/30/2019     Influenza Vaccine, 6+MO IM (QUADRIVALENT W/PRESERVATIVES) 09/20/2012     Pneumo Conj 13-V (2010&after) 06/08/2015     Pneumococcal 23 valent 06/02/2014     Tdap (Adacel,Boostrix) 06/30/2016     Zoster vaccine recombinant adjuvanted (SHINGRIX) 01/02/2020,  "05/26/2020     Zoster vaccine, live 05/07/2012       Patient has been advised of split billing requirements and indicates understanding: Yes  COUNSELING:  Reviewed preventive health counseling, as reflected in patient instructions       Healthy diet/nutrition    Estimated body mass index is 28.02 kg/m  as calculated from the following:    Height as of this encounter: 1.74 m (5' 8.5\").    Weight as of this encounter: 84.8 kg (187 lb).    He reports that he has never smoked. He has never used smokeless tobacco.      Appropriate preventive services were discussed with this patient, including applicable screening as appropriate for cardiovascular disease, diabetes, osteopenia/osteoporosis, and glaucoma.  As appropriate for age/gender, discussed screening for colorectal cancer, prostate cancer, breast cancer, and cervical cancer. Checklist reviewing preventive services available has been given to the patient.    Reviewed patients plan of care and provided an AVS. The Basic Care Plan (routine screening as documented in Health Maintenance) for Niranjan meets the Care Plan requirement. This Care Plan has been established and reviewed with the Patient.    Counseling Resources:      JANE NUNES MD  [facility]    Identified Health Risks:  "

## 2021-10-01 NOTE — PATIENT INSTRUCTIONS
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    He is fasting this morning, so we will check a lipid panel, comprehensive metabolic panel, blood cell counts, thyroid cascade, and screening PSA.    Going issue by issue:    Essential tremor, diagnosis confirmed at [facility] Dr. Luke Iverson consultation date September 2, 2021, and he is also had consultations at other major medical centers in the [place] and [place].    Onset of the tremor was approximately 2005, both hands affected but primarily the right hand, which affects his handwriting, painting, ability to hold a drinking glass.  Symptoms have been gradually progressive.  There are no other neurological deficits.    I agree with Dr. Iverson's recommendation to try propranolol as first-line therapy.  I am issuing a prescription for propranolol extended release 60 mg once a day.  I told Mr. Dodge to be on the look out for side effects associated with lowering of blood pressure, namely lightheadedness, dizziness, fainting.  The first time he tries this medicine, I want him to be in a safe environment, not driving or operating heavy machinery.    Second line medications that Dr. Iverson suggested are Mysoline, gabapentin, and topiramate.  Dr. Iverson also talked about more invasive treatments if medication fails, such as deep brain stimulation and MRI guided focused ultrasound lesioning of the thalamus.    Sleep apnea, compliant with CPAP treatment.  History of a uvulectomy that was done in [place], which was not helpful and actually caused trouble swallowing for couple years.    Hyperlipidemia, no history of cardiovascular events, lipids were beautifully controlled when measured in 2020, on a modest dose of atorvastatin 10 mg a day    History of colon polyps.  Notes from his previous doctors talked about next colonoscopy being due in 2024, but Mr. Rudolph is thinks it  may be due sooner than that.  I asked him to call [facility], and ask them when his next exam would be recommended.    Varicose veins, not symptomatically bothersome.    Earwax occluding the right tympanic canal.  I recommended that he use over-the-counter wax dissolving eardrop, example brand Debrox or Murine.    Received second dose of Pfizer COVID-19 vaccine on March 1, 2021, and we can schedule him for his third shot of Pfizer.  He already got his seasonal flu vaccine for 2021.  He is received both pneumococcal vaccines, both shingles vaccines, and is current on tetanus which was administered in 2016    Immunization History   Administered Date(s) Administered     FLU 6-35 months 09/23/2016     Flu, Unspecified 09/09/2020     Influenza (High Dose) 3 valent vaccine 10/05/2013, 09/14/2017, 08/30/2019     Influenza Vaccine, 6+MO IM (QUADRIVALENT W/PRESERVATIVES) 09/20/2012     Pneumo Conj 13-V (2010&after) 06/08/2015     Pneumococcal 23 valent 06/02/2014     Tdap (Adacel,Boostrix) 06/30/2016     Zoster vaccine recombinant adjuvanted (SHINGRIX) 01/02/2020, 05/26/2020     Zoster vaccine, live 05/07/2012

## 2021-10-02 ENCOUNTER — MYC MEDICAL ADVICE (OUTPATIENT)
Dept: INTERNAL MEDICINE | Facility: CLINIC | Age: 76
End: 2021-10-02

## 2021-10-23 DIAGNOSIS — G25.0 BENIGN ESSENTIAL TREMOR: ICD-10-CM

## 2021-10-25 RX ORDER — PROPRANOLOL HCL 60 MG
CAPSULE, EXTENDED RELEASE 24HR ORAL
Qty: 30 CAPSULE | Refills: 11 | OUTPATIENT
Start: 2021-10-25

## 2021-11-02 DIAGNOSIS — E78.00 HYPERCHOLESTEROLEMIA: ICD-10-CM

## 2021-11-02 DIAGNOSIS — G25.0 BENIGN ESSENTIAL TREMOR: ICD-10-CM

## 2021-11-02 RX ORDER — PROPRANOLOL HCL 60 MG
60 CAPSULE, EXTENDED RELEASE 24HR ORAL DAILY
Qty: 90 CAPSULE | Refills: 3 | Status: SHIPPED | OUTPATIENT
Start: 2021-11-02 | End: 2022-10-06

## 2021-11-02 RX ORDER — ATORVASTATIN CALCIUM 10 MG/1
10 TABLET, FILM COATED ORAL DAILY
Qty: 90 TABLET | Refills: 3 | Status: SHIPPED | OUTPATIENT
Start: 2021-11-02 | End: 2022-10-06

## 2021-11-02 NOTE — TELEPHONE ENCOUNTER
Reason for Call:  Medication or medication refill: Med Refill     Do you use a [facility]? No. Name of the pharmacy and phone number for the current request:  [facility]/pharmacy #[id number] - [place], [place] - [address] AT [facility]  [phone]    Name of the medication requested:     atorvastatin (LIPITOR) 10 MG tablet  propranolol ER (INDERAL LA) 60 MG 24 hr capsule    Other request: NONE    Can we leave a detailed message on this number? YES    Phone number patient can be reached at: Cell number on file:    Telephone Information:   Mobile [phone]       Best Time: ANY    Call taken on 11/2/2021 at 8:34 AM by Bonifacio Brooks

## 2021-11-02 NOTE — TELEPHONE ENCOUNTER
Refill Request  Medication name: Pending Prescriptions:                       Disp   Refills    atorvastatin (LIPITOR) 10 MG tablet       90 tab*3            Sig: Take 1 tablet (10 mg) by mouth daily    propranolol ER (INDERAL LA) 60 MG 24 hr c*30 cap*11           Sig: Take 1 capsule (60 mg) by mouth daily    Who prescribed the medication: Atorvastatin - Dr. Herberth Duffy; Propranolol ER - Dr. Ham Morejon  Last refill on medication: 11/10/20 (Atorvastatin) & 10/1/21 (Propranolol ER)  Requested Pharmacy: [facility]  Last appointment with PCP: 10/1/21 - Established care with Dr. Morejon  Next appointment: Not due

## 2021-11-08 ENCOUNTER — TELEPHONE (OUTPATIENT)
Dept: INTERNAL MEDICINE | Facility: CLINIC | Age: 76
End: 2021-11-08
Payer: MEDICARE

## 2021-11-08 NOTE — TELEPHONE ENCOUNTER
Called pt. He is wondering if he can take the Propanolol ER 60 mg  every other day right before breakfast instead of after until his stomach gets used to. He used to take it after breakfast and he got an upset stomach and the last couple days he has taken it before breakfast and it seems to not give him an upset stomach. He wants to take it every other day for a couple weeks if that's okay and then go to daily.Please advise.

## 2021-11-08 NOTE — TELEPHONE ENCOUNTER
Reason for Call:  Other call back and prescription    Detailed comments: propranolol ER (INDERAL LA) 60 MG 24 hr capsule - pt has questions about this medication. Please call back and advise. Would not go into details with me about the medication or questions.     Phone Number Patient can be reached at: Cell number on file:    Telephone Information:   Mobile [phone]       Best Time: ANY    Can we leave a detailed message on this number? YES    Call taken on 11/8/2021 at 3:40 PM by Bonifacio Brooks

## 2022-03-08 ENCOUNTER — TELEPHONE (OUTPATIENT)
Dept: INTERNAL MEDICINE | Facility: CLINIC | Age: 77
End: 2022-03-08

## 2022-03-08 NOTE — TELEPHONE ENCOUNTER
I do not have any information to give patient about a CPAP machine. I do not get involved with types of machines. All information for CPAP machines go through the sleep clinic.

## 2022-03-08 NOTE — TELEPHONE ENCOUNTER
Niranjan is cleared for a new Cpap machine and is looking into the Inspire machine and is curious as to if you have any recommendations to who could install or calibrate this machine for him, And if you have any general thoughts on this machine as well?Please Advise.

## 2022-03-08 NOTE — TELEPHONE ENCOUNTER
Reason for Call:  Other call back    Detailed comments: Patient is requesting a call back from medical assistant. Declined to speak to me about what he needed.    Phone Number Patient can be reached at: Home number on file [phone] (home)    Best Time: any    Can we leave a detailed message on this number? YES    Call taken on 3/8/2022 at 11:19 AM by Bonifacio Brooks

## 2022-05-26 ENCOUNTER — OFFICE VISIT (OUTPATIENT)
Dept: INTERNAL MEDICINE | Facility: CLINIC | Age: 77
End: 2022-05-26
Payer: MEDICARE

## 2022-05-26 VITALS
SYSTOLIC BLOOD PRESSURE: 110 MMHG | BODY MASS INDEX: 29.53 KG/M2 | OXYGEN SATURATION: 96 % | DIASTOLIC BLOOD PRESSURE: 64 MMHG | HEART RATE: 71 BPM | WEIGHT: 197.1 LBS

## 2022-05-26 DIAGNOSIS — G25.0 BENIGN ESSENTIAL TREMOR: ICD-10-CM

## 2022-05-26 DIAGNOSIS — R42 DISEQUILIBRIUM: ICD-10-CM

## 2022-05-26 DIAGNOSIS — R51.9 NONINTRACTABLE EPISODIC HEADACHE, UNSPECIFIED HEADACHE TYPE: Primary | ICD-10-CM

## 2022-05-26 DIAGNOSIS — G47.33 OBSTRUCTIVE SLEEP APNEA SYNDROME: ICD-10-CM

## 2022-05-26 LAB
ALBUMIN SERPL-MCNC: 4.1 G/DL (ref 3.5–5)
ALP SERPL-CCNC: 95 U/L (ref 45–120)
ALT SERPL W P-5'-P-CCNC: 20 U/L (ref 0–45)
ANION GAP SERPL CALCULATED.3IONS-SCNC: 10 MMOL/L (ref 5–18)
AST SERPL W P-5'-P-CCNC: 24 U/L (ref 0–40)
BILIRUB SERPL-MCNC: 1.7 MG/DL (ref 0–1)
BUN SERPL-MCNC: 17 MG/DL (ref 8–28)
CALCIUM SERPL-MCNC: 9.9 MG/DL (ref 8.5–10.5)
CHLORIDE BLD-SCNC: 102 MMOL/L (ref 98–107)
CO2 SERPL-SCNC: 27 MMOL/L (ref 22–31)
CREAT SERPL-MCNC: 0.78 MG/DL (ref 0.7–1.3)
ERYTHROCYTE [DISTWIDTH] IN BLOOD BY AUTOMATED COUNT: 11.8 % (ref 10–15)
GFR SERPL CREATININE-BSD FRML MDRD: >90 ML/MIN/1.73M2
GLUCOSE BLD-MCNC: 87 MG/DL (ref 70–125)
HCT VFR BLD AUTO: 44.6 % (ref 40–53)
HGB BLD-MCNC: 14.9 G/DL (ref 13.3–17.7)
MCH RBC QN AUTO: 31.4 PG (ref 26.5–33)
MCHC RBC AUTO-ENTMCNC: 33.4 G/DL (ref 31.5–36.5)
MCV RBC AUTO: 94 FL (ref 78–100)
PLATELET # BLD AUTO: 156 10E3/UL (ref 150–450)
POTASSIUM BLD-SCNC: 4.9 MMOL/L (ref 3.5–5)
PROT SERPL-MCNC: 7.8 G/DL (ref 6–8)
RBC # BLD AUTO: 4.75 10E6/UL (ref 4.4–5.9)
SODIUM SERPL-SCNC: 139 MMOL/L (ref 136–145)
VIT B12 SERPL-MCNC: 307 PG/ML (ref 213–816)
WBC # BLD AUTO: 5.6 10E3/UL (ref 4–11)

## 2022-05-26 PROCEDURE — 80053 COMPREHEN METABOLIC PANEL: CPT | Performed by: INTERNAL MEDICINE

## 2022-05-26 PROCEDURE — 82607 VITAMIN B-12: CPT | Performed by: INTERNAL MEDICINE

## 2022-05-26 PROCEDURE — 85027 COMPLETE CBC AUTOMATED: CPT | Performed by: INTERNAL MEDICINE

## 2022-05-26 PROCEDURE — 99214 OFFICE O/P EST MOD 30 MIN: CPT | Performed by: INTERNAL MEDICINE

## 2022-05-26 PROCEDURE — 36415 COLL VENOUS BLD VENIPUNCTURE: CPT | Performed by: INTERNAL MEDICINE

## 2022-05-26 NOTE — PROGRESS NOTES
Assessment & Plan     Nonintractable episodic headache, unspecified headache type  77-year-old male with no history of chronic headaches now experiencing frequent almost daily headache for the past 6 weeks.  Located in the back of his head.  He does have chronic neck problems but those symptoms feel different than current headache pain.  No recent trauma.  He did undergo inspire procedure for sleep apnea and headache started shortly thereafter.  He also stopped taking his propranolol around this time.  This was prescribed for tremor.  He is using Tylenol providing some partial relief.  Of concern is that he is also developed increasing problems with balance and disequilibrium over the past 1 to 2 weeks.  This occurs when he is trying to go for his regular walk.  He has not fallen.  No syncope.  No vertigo.  Does not feel lightheaded when standing but symptoms are more of feeling off balance when walking.  Exam today shows him to be neurologically intact both cranial nerves and motor.  We will check appropriate metabolic studies including B12 level.  Symptoms could be unrelated.  He may be experiencing problem with vestibular apparatus contributing to his disequilibrium.  Headaches could be the result of discontinuing his beta-blocker.  He is reluctant to restart as he was having side effects including nausea.  Although his neurologic exam is nonfocal, other more worrisome etiologies including brain tumor are consideration with new onset of headaches and problems with balance disequilibrium.  Proceed with MRI brain.  He has an appointment set up with ENT as it is difficult to determine whether any of this could be related to recent Inspire procedure.  May need to consider neurologic referral as well.  I would encourage him to retry propranolol before moving in that direction.  - CBC with platelets; Future  - Comprehensive metabolic panel (BMP + Alb, Alk Phos, ALT, AST, Total. Bili, TP); Future  - MR Brain w/o  "Contrast; Future  - CBC with platelets  - Comprehensive metabolic panel (BMP + Alb, Alk Phos, ALT, AST, Total. Bili, TP)    Disequilibrium  As above, increasing disequilibrium over the last 1 to 2 weeks.  - CBC with platelets; Future  - Comprehensive metabolic panel (BMP + Alb, Alk Phos, ALT, AST, Total. Bili, TP); Future  - Vitamin B12; Future  - MR Brain w/o Contrast; Future  - CBC with platelets  - Comprehensive metabolic panel (BMP + Alb, Alk Phos, ALT, AST, Total. Bili, TP)  - Vitamin B12    Benign essential tremor  Previously using propranolol 60 mg daily for tremor which she discontinued about 6 weeks ago as it was causing him side effects including nausea.    Obstructive sleep apnea syndrome  Previous uvulectomy which was unhelpful.  Underwent Inspire procedure in mid March.        30 minutes spent on the date of the encounter doing chart review, history and exam, documentation and further activities per the note       BMI:   Estimated body mass index is 29.53 kg/m  as calculated from the following:    Height as of 10/1/21: 1.74 m (5' 8.5\").    Weight as of this encounter: 89.4 kg (197 lb 1.6 oz).           Return in about 3 months (around 8/26/2022) for Follow up.    Niranjan Urrutia MD  [facility]    Susanne Ureña is a 77 year old who presents for the following health issues  .  Here to discuss new onset headaches and disequilibrium.  See assessment plan for details.    HPI       Review of Systems   12 point review of systems is negative other than what is discussed in assessment and plan      Objective    /64 (BP Location: Right arm, Patient Position: Sitting, Cuff Size: Adult Regular)   Pulse 71   Wt 89.4 kg (197 lb 1.6 oz)   SpO2 96%   BMI 29.53 kg/m    Body mass index is 29.53 kg/m .  Physical Exam     HEENT normal  No carotid bruits  Heart regular rate and rhythm without murmur gallop  Neurologic exam with normal cranial nerves and intact motor.  " Normal coordination.  Alert and oriented x3.

## 2022-06-07 ENCOUNTER — TELEPHONE (OUTPATIENT)
Dept: INTERNAL MEDICINE | Facility: CLINIC | Age: 77
End: 2022-06-07
Payer: MEDICARE

## 2022-06-07 NOTE — TELEPHONE ENCOUNTER
Lab results per Dr. Urrutia:    B12 level is okay. No diabetes.  Normal kidney function and normal liver studies.  Slightly elevated total bilirubin is of no significance. Normal CBC.  No anemia.  Normal white blood cell count.    Simona Saucedo, CMA

## 2022-06-07 NOTE — TELEPHONE ENCOUNTER
Reason for Call:  Request for results: lab results from 05/26/2022    Name of test or procedure: lab results    Date of test of procedure: 05/26/2022    Location of the test or procedure: [facility] to leave the result message on voice mail or with a family member? YES    Phone number Patient can be reached at:  Home number on file [phone] (home)    Additional comments: Patient would like a call back to go over all of his lab results from 05/26/2022    Call taken on 6/7/2022 at 10:18 AM by Lillian Garcia

## 2022-06-28 ENCOUNTER — HOSPITAL ENCOUNTER (OUTPATIENT)
Dept: MRI IMAGING | Facility: CLINIC | Age: 77
Discharge: HOME | End: 2022-06-28
Attending: INTERNAL MEDICINE | Admitting: INTERNAL MEDICINE
Payer: MEDICARE

## 2022-06-28 DIAGNOSIS — R51.9 NONINTRACTABLE EPISODIC HEADACHE, UNSPECIFIED HEADACHE TYPE: ICD-10-CM

## 2022-06-28 DIAGNOSIS — R42 DISEQUILIBRIUM: ICD-10-CM

## 2022-06-28 PROCEDURE — G1004 CDSM NDSC: HCPCS

## 2022-06-28 PROCEDURE — G1004 CDSM NDSC: HCPCS | Performed by: RADIOLOGY

## 2022-06-28 PROCEDURE — 70551 MRI BRAIN STEM W/O DYE: CPT | Mod: 26 | Performed by: RADIOLOGY

## 2022-07-05 ENCOUNTER — TELEPHONE (OUTPATIENT)
Dept: INTERNAL MEDICINE | Facility: CLINIC | Age: 77
End: 2022-07-05

## 2022-07-05 NOTE — TELEPHONE ENCOUNTER
Called patient and let him know that Dr Urrutia had commented on his MRI results stating everything was normal/not concerning. Pt also asked about previous lab results and I passed on to him all of Dr Urrutia's comments for those too, all normal.

## 2022-07-05 NOTE — TELEPHONE ENCOUNTER
Pt is requesting MRI test results from 06/28/22. Test is final result. Pt would a return phone call from Dr Ureña

## 2022-10-06 ENCOUNTER — OFFICE VISIT (OUTPATIENT)
Dept: INTERNAL MEDICINE | Facility: CLINIC | Age: 77
End: 2022-10-06
Payer: MEDICARE

## 2022-10-06 VITALS
HEART RATE: 62 BPM | HEIGHT: 69 IN | BODY MASS INDEX: 26.51 KG/M2 | OXYGEN SATURATION: 98 % | DIASTOLIC BLOOD PRESSURE: 76 MMHG | SYSTOLIC BLOOD PRESSURE: 122 MMHG | WEIGHT: 179 LBS

## 2022-10-06 DIAGNOSIS — Z12.5 SCREENING FOR PROSTATE CANCER: ICD-10-CM

## 2022-10-06 DIAGNOSIS — N52.9 ERECTILE DYSFUNCTION, UNSPECIFIED ERECTILE DYSFUNCTION TYPE: ICD-10-CM

## 2022-10-06 DIAGNOSIS — G47.33 OSA (OBSTRUCTIVE SLEEP APNEA): ICD-10-CM

## 2022-10-06 DIAGNOSIS — Z86.0100 HISTORY OF COLONIC POLYPS: ICD-10-CM

## 2022-10-06 DIAGNOSIS — Z51.81 ENCOUNTER FOR THERAPEUTIC DRUG MONITORING: ICD-10-CM

## 2022-10-06 DIAGNOSIS — E78.00 HYPERCHOLESTEROLEMIA: ICD-10-CM

## 2022-10-06 DIAGNOSIS — Z85.828 HISTORY OF BASAL CELL CARCINOMA (BCC) OF SKIN: ICD-10-CM

## 2022-10-06 DIAGNOSIS — Z00.00 ENCOUNTER FOR WELLNESS EXAMINATION IN ADULT: Primary | ICD-10-CM

## 2022-10-06 DIAGNOSIS — G25.0 BENIGN ESSENTIAL TREMOR: ICD-10-CM

## 2022-10-06 DIAGNOSIS — H61.21 IMPACTED CERUMEN OF RIGHT EAR: ICD-10-CM

## 2022-10-06 DIAGNOSIS — G44.86 CERVICOGENIC HEADACHE: ICD-10-CM

## 2022-10-06 PROCEDURE — G0439 PPPS, SUBSEQ VISIT: HCPCS | Performed by: INTERNAL MEDICINE

## 2022-10-06 PROCEDURE — 99214 OFFICE O/P EST MOD 30 MIN: CPT | Mod: 25 | Performed by: INTERNAL MEDICINE

## 2022-10-06 RX ORDER — ERYTHROMYCIN 5 MG/G
OINTMENT OPHTHALMIC
COMMUNITY
Start: 2022-09-13 | End: 2022-10-06

## 2022-10-06 RX ORDER — ATORVASTATIN CALCIUM 10 MG/1
10 TABLET, FILM COATED ORAL DAILY
Qty: 90 TABLET | Refills: 3 | Status: SHIPPED | OUTPATIENT
Start: 2022-10-06 | End: 2023-10-09

## 2022-10-06 RX ORDER — PROPRANOLOL HCL 60 MG
60 CAPSULE, EXTENDED RELEASE 24HR ORAL DAILY
Qty: 90 CAPSULE | Refills: 3 | Status: SHIPPED | OUTPATIENT
Start: 2022-10-06 | End: 2024-03-25

## 2022-10-06 RX ORDER — TADALAFIL 10 MG/1
10 TABLET ORAL DAILY PRN
Qty: 10 TABLET | Refills: 4 | Status: SHIPPED | OUTPATIENT
Start: 2022-10-06 | End: 2023-10-09

## 2022-10-06 ASSESSMENT — ENCOUNTER SYMPTOMS
HEARTBURN: 0
JOINT SWELLING: 0
WEAKNESS: 0
HEMATOCHEZIA: 0
CONSTIPATION: 0
EYE PAIN: 0
FEVER: 0
MYALGIAS: 1
ABDOMINAL PAIN: 0
HEMATURIA: 0
SHORTNESS OF BREATH: 0
COUGH: 0
NERVOUS/ANXIOUS: 0
DYSURIA: 0
DIZZINESS: 0
SORE THROAT: 0
FREQUENCY: 1
NAUSEA: 0
PALPITATIONS: 0
DIARRHEA: 0
HEADACHES: 1
ARTHRALGIAS: 1
CHILLS: 0
PARESTHESIAS: 0

## 2022-10-06 ASSESSMENT — ACTIVITIES OF DAILY LIVING (ADL): CURRENT_FUNCTION: NO ASSISTANCE NEEDED

## 2022-10-06 NOTE — PROGRESS NOTES
SUBJECTIVE:   Niranjan is a 77 year old who presents for Preventive Visit.  [facility]  He spends most of his time in [place].    Has a long history of sleep apnea, described as moderate severity in the past.  He had a previous uvuloplasty.  He had been using CPAP.  He had an inspire implant placed April of this year.  He has had some difficulty tolerating it so far.  He was complaining of waking up with headaches and fatigue.  Patient is working with his sleep apnea clinic currently.    He does have a history of drinking alcohol in the evening.    He did have an MRI of the brain June 2022 which was normal.    No history of heart failure.  No increasing shortness of breath.  No history of arrhythmia or palpitations.    Is having cervical dystonia and neck pain and headaches and is planning Botox injections soon.  He is doing physical therapy and has not felt adequate relief with his physical therapy exercises.    He does regular walking and exercises.  Excellent exertional ability.  No chest pain or increasing shortness of breath or edema.    He had a brother with coronary disease.  2018 cardiac CT scan calcium score 44.  He is on Lipitor 10 mg a day.  Last year his LDL cholesterol was 95.  No generalized myalgias or history of liver problems.  His liver tests in May of this year were normal.    He is no longer taking aspirin.  No history of GI bleeding.    No falls.    He have a longstanding essential tremor he does find some benefit from Inderal LA 60 mg a day.  He is not taking that currently as he had some stomach upset when taking on empty stomach in the past.    No urinary symptoms except for occasional urinary urgency.  PSA level was 0.37 last year.    He does have erectile dysfunction, is asking about a longer acting form.    His bowels are regular no abdominal pain complaints.  No blood in stool.  He did have a colonoscopy September 2019 according to my notes  "with the plan for 3-year follow-up.  But he has not heard from gastroenterology.  Review of the chart does not show a copy of his last colonoscopy.  He does state he had it in [place].    Past history basal cell cancer.  He sees dermatology in [place], and has an appointment next month.    He saw the ophthalmologist approximately 1 year ago.  Denies vision changes.        Patient has been advised of split billing requirements and indicates understanding: Yes  Are you in the first 12 months of your Medicare coverage?  No    HPI  Do you feel safe in your environment? Yes    Have you ever done Advance Care Planning? (For example, a Health Directive, POLST, or a discussion with a medical provider or your loved ones about your wishes): Yes, advance care planning is on file.      Fall risk  Fallen 2 or more times in the past year?: No  Any fall with injury in the past year?: No  click delete button to remove this line now  Cognitive Screening   1) Repeat 3 items (Leader, Season, Table)    2) Clock draw: NORMAL  3) 3 item recall: Recalls 2 objects   Results: NORMAL clock, 1-2 items recalled: COGNITIVE IMPAIRMENT LESS LIKELY    Mini-CogTM Copyright YADIRA Richards. Licensed by the author for use in [facility]; reprinted with permission (pamela@[facility]). All rights reserved.      Do you have sleep apnea, excessive snoring or daytime drowsiness?: yes    Reviewed and updated as needed this visit by clinical staff   Tobacco  Allergies  Meds                Reviewed and updated as needed this visit by Provider                   Social History     Tobacco Use     Smoking status: Never Smoker     Smokeless tobacco: Never Used   Substance Use Topics     Alcohol use: Yes     Alcohol/week: 14.0 standard drinks     Types: 14 Shots of liquor per week     Comment: Drinks \"2 scotch per day.\" His partner said he has 3 per day.     If you drink alcohol do you typically have >3 drinks per day or >7 drinks per week? No    Alcohol Use " 10/6/2022   Prescreen: >3 drinks/day or >7 drinks/week? Yes   Prescreen: >3 drinks/day or >7 drinks/week? -   AUDIT SCORE  -     AUDIT - Alcohol Use Disorders Identification Test - Reproduced from the World Health Organization Audit 2001 (Second Edition) 10/6/2022   1.  How often do you have a drink containing alcohol? 4 or more times a week   2.  How many drinks containing alcohol do you have on a typical day when you are drinking? 1 or 2   3.  How often do you have five or more drinks on one occasion? Never   4.  How often during the last year have you found that you were not able to stop drinking once you had started? -   5.  How often during the last year have you failed to do what was normally expected of you because of drinking? -   6.  How often during the last year have you needed a first drink in the morning to get yourself going after a heavy drinking session? -   7.  How often during the last year have you had a feeling of guilt or remorse after drinking? -   8.  How often during the last year have you been unable to remember what happened the night before because of your drinking? -   9.  Have you or someone else been injured because of your drinking? No   10. Has a relative, friend, doctor or other health care worker been concerned about your drinking or suggested you cut down? No   TOTAL SCORE -               Current providers sharing in care for this patient include:   Patient Care Team:  Herberth Duffy MD as PCP - General (Internal Medicine)  Angelita Muñoz APRN CNP as Assigned Neuroscience Provider  Ham Morejon MD as Assigned PCP    The following health maintenance items are reviewed in Epic and correct as of today:  Health Maintenance   Topic Date Due     ANNUAL REVIEW OF HM ORDERS  Never done     COLORECTAL CANCER SCREENING  Never done     HEPATITIS C SCREENING  Never done     MEDICARE ANNUAL WELLNESS VISIT  10/01/2022     FALL RISK ASSESSMENT  10/06/2023     DTAP/TDAP/TD  "IMMUNIZATION (2 - Td or Tdap) 2026     LIPID  10/01/2026     ADVANCE CARE PLANNING  10/01/2026     PHQ-2 (once per calendar year)  Completed     INFLUENZA VACCINE  Completed     Pneumococcal Vaccine: 65+ Years  Completed     ZOSTER IMMUNIZATION  Completed     COVID-19 Vaccine  Completed     IPV IMMUNIZATION  Aged Out     MENINGITIS IMMUNIZATION  Aged Out     HEPATITIS B IMMUNIZATION  Aged Out     Patient Active Problem List   Diagnosis     External Hemorrhoids     Ptosis Of Eyelid     Sleep Apnea     History of colon polyps     Hypercholesterolemia     Benign essential tremor     Nonintractable episodic headache     Disequilibrium     Past Surgical History:   Procedure Laterality Date     Colon Surgery due to Fistula       Left Knee Arthroscopic Surgery Left      Schwannoma Removal      Inferior aspect of the tongue     SKIN CANCER EXCISION Left     To remove basal cell CA from left lower eyelid       Social History     Tobacco Use     Smoking status: Never Smoker     Smokeless tobacco: Never Used   Substance Use Topics     Alcohol use: Yes     Alcohol/week: 14.0 standard drinks     Types: 14 Shots of liquor per week     Comment: Drinks \"2 scotch per day.\" His partner said he has 3 per day.     Family History   Problem Relation Age of Onset     Tremor Mother      Cerebrovascular Disease Mother      Emphysema Father      Tremor Sister         yonger sister     Blood Disease Sister          at age 73     Sleep Apnea Father      Snoring Father          Current Outpatient Medications   Medication Sig Dispense Refill     atorvastatin (LIPITOR) 10 MG tablet Take 1 tablet (10 mg) by mouth daily 90 tablet 3     propranolol ER (INDERAL LA) 60 MG 24 hr capsule Take 1 capsule (60 mg) by mouth daily 90 capsule 3     sildenafil (REVATIO) 20 mg tablet [SILDENAFIL (REVATIO) 20 MG TABLET] Take 3 tablets (60 mg total) by mouth daily as needed. 90 tablet 3     tadalafil (CIALIS) 10 MG tablet Take 1 tablet (10 mg) by mouth " "daily as needed (Erectile dysfunction) 10 tablet 4     No Known Allergies    Review of Systems  Constitutional, HEENT, cardiovascular, pulmonary, GI, , musculoskeletal, neuro, skin, endocrine and psych systems are negative, except as otherwise noted.    OBJECTIVE:   Ht 1.74 m (5' 8.5\")   Wt 81.2 kg (179 lb)   BMI 26.82 kg/m   Estimated body mass index is 26.82 kg/m  as calculated from the following:    Height as of this encounter: 1.74 m (5' 8.5\").    Weight as of this encounter: 81.2 kg (179 lb).  Physical Exam  Alert and oriented x3.  Normal clock face drawing.  Normal word recall.  Normal mobility.  Pupils NOSs equal and reactive.  Extraocular muscles intact.  No jaundice or conjunctivitis.  External ears and nose exam is normal.  He does have cerumen impaction on the right and moderate cerumen on the left.  No cervical or supraclavicular adenopathy.  Pharynx shows uvuloplasty, otherwise normal-appearing pharynx.  Teeth in good condition.  No JVD and no carotid bruits.  No thyromegaly or nodularity.  Lungs clear to auscultation with normal respiratory excursion.  Heart is regular with no murmur rub or gallop.  Abdomen is nontender no hepatosplenomegaly and no pulsatile abdominal mass.  No ankle edema and +1 pedal pulses.  Feet in good condition.  Skin exam of the sclerotic lesion on his left upper arm but appears to be more of a sclerotic seborrheic keratosis.  I did not see any other suspicious skin lesions.  Patient was alerted to the left arm lesion.  Rectal exam shows a smooth symmetric prostate without nodule.      ASSESSMENT / PLAN:   (Z00.00) Encounter for wellness examination in adult  (primary encounter diagnosis)  Comment: I emphasized the importance of maintaining regular exercise which she is doing.    Moderate cardiovascular risk factors but asymptomatic and good exercise tolerance.  We did discuss use of aspirin, but likely bleeding risk outweighs benefit and he will stay off of that.  Plan: " Full Code        Patient was reminded to see ophthalmology on a yearly basis for eye exam.    Patient's had his flu shot and COVID vaccine, Tdap in 2016 and had Shingrix and up-to-date on Pneumovax.    (G47.33) SHA (obstructive sleep apnea)  Comment: Currently using inspire, but unclear if adequately controlling his sleep apnea.  He could still use CPAP.  He will follow-up with his sleep clinic doctor to manage his sleep apnea.  Plan: Avoid alcohol    (E78.00) Hypercholesterolemia  Comment: Goal LDL less than 130.  Plan: atorvastatin (LIPITOR) 10 MG tablet, Lipid         Profile        No evidence of toxicity.  Continue current dose.    Patient will schedule a lab appointment tomorrow morning    (Z12.5) Screening for prostate cancer  Comment: Routine yearly screening  Plan: Prostate Specific Antigen Screen            (H61.21) Impacted cerumen of right ear  Comment: He can see ENT to clean out ears  Plan: Adult ENT  Referral            (N52.9) Erectile dysfunction, unspecified erectile dysfunction type  Comment: He wanted a replacement for Viagra, given Cialis and good Rx card  Plan: tadalafil (CIALIS) 10 MG tablet            (G25.0) Benign essential tremor  Comment: Controlled.  Some stomach upset in the past, can take with food  Plan: propranolol ER (INDERAL LA) 60 MG 24 hr capsule        Reduce alcohol    (Z86.010) History of colonic polyps  Comment: Patient was told to determine his colonoscopy follow-up plan.  Patient was told that he appears due for colonoscopy now.  Patient wanted to check with his gastroenterology clinic to confirm plan for follow-up colonoscopy.    I did asked patient to have his records sent to our clinic.  Plan: As above    (Z51.81) Encounter for therapeutic drug monitoring  Comment:   Plan: Comprehensive metabolic panel, CBC with         platelets            (G44.86) Cervicogenic headache  Comment: Neck pain with sleeping.  May have been associated with inadequate control of his  "sleep apnea, or the new inspire implant.  Patient states he is planning to undergo Botox injections, ordered from outside clinic.  Patient was warned of possible risk of Botox injections including muscle atrophy or nerve injury.  Patient was told to continue with physical therapy but he states has been ineffective for him.  Plan:     (Z85.828) History of basal cell carcinoma (BCC) of skin  Comment: Patient will follow-up with his dermatologist in [place] this November.  He is alerted to sclerotic lesion on left arm  Plan:     Patient has been advised of split billing requirements and indicates understanding: Yes    COUNSELING:  Reviewed preventive health counseling, as reflected in patient instructions       Regular exercise       Healthy diet/nutrition       Vision screening       Colon cancer screening       Prostate cancer screening    Estimated body mass index is 26.82 kg/m  as calculated from the following:    Height as of this encounter: 1.74 m (5' 8.5\").    Weight as of this encounter: 81.2 kg (179 lb).        He reports that he has never smoked. He has never used smokeless tobacco.      Appropriate preventive services were discussed with this patient, including applicable screening as appropriate for cardiovascular disease, diabetes, osteopenia/osteoporosis, and glaucoma.  As appropriate for age/gender, discussed screening for colorectal cancer, prostate cancer, breast cancer, and cervical cancer. Checklist reviewing preventive services available has been given to the patient.    Reviewed patients plan of care and provided an AVS. The Basic Care Plan (routine screening as documented in Health Maintenance) for Niranjan meets the Care Plan requirement. This Care Plan has been established and reviewed with the Patient.    Counseling Resources:  ATP IV Guidelines  Pooled Cohorts Equation Calculator  Breast Cancer Risk Calculator  Breast Cancer: Medication to Reduce Risk  FRAX Risk Assessment  ICSI Preventive " Guidelines  Dietary Guidelines for Americans, 2010  USDA's MyPlate  ASA Prophylaxis  Lung CA Screening    Herberth Duffy MD  [facility]    Identified Health Risks:

## 2022-10-06 NOTE — PATIENT INSTRUCTIONS
Contact your gastroenterology clinic to determine your follow-up plan for colonoscopy.  I believe you are due now for a 3-year colonoscopy follow-up because of history of polyps.  Have them send us your last colonoscopy report as well.    See ophthalmology yearly for routine eye exam.    You can use Cialis medication instead of your sildenafil for erectile dysfunction.    See dermatology this fall as planned for your skin cancer history.  Have them evaluate a sclerotic lesion on your left arm.    Work with the sleep clinic regarding treatment for your sleep apnea.    See me in 1 year for an adult wellness visit physical exam.  Check your [facility] computer portal for results of lab work.

## 2022-10-07 ENCOUNTER — TELEPHONE (OUTPATIENT)
Dept: INTERNAL MEDICINE | Facility: CLINIC | Age: 77
End: 2022-10-07

## 2022-10-07 ENCOUNTER — LAB (OUTPATIENT)
Dept: LAB | Facility: CLINIC | Age: 77
End: 2022-10-07
Payer: MEDICARE

## 2022-10-07 DIAGNOSIS — Z12.5 SCREENING FOR PROSTATE CANCER: ICD-10-CM

## 2022-10-07 DIAGNOSIS — E78.00 HYPERCHOLESTEROLEMIA: ICD-10-CM

## 2022-10-07 DIAGNOSIS — Z51.81 ENCOUNTER FOR THERAPEUTIC DRUG MONITORING: ICD-10-CM

## 2022-10-07 LAB
ALBUMIN SERPL BCG-MCNC: 4.7 G/DL (ref 3.5–5.2)
ALP SERPL-CCNC: 81 U/L (ref 40–129)
ALT SERPL W P-5'-P-CCNC: 28 U/L (ref 10–50)
ANION GAP SERPL CALCULATED.3IONS-SCNC: 8 MMOL/L (ref 7–15)
AST SERPL W P-5'-P-CCNC: 36 U/L (ref 10–50)
BILIRUB SERPL-MCNC: 2 MG/DL
BUN SERPL-MCNC: 13.1 MG/DL (ref 8–23)
CALCIUM SERPL-MCNC: 9.8 MG/DL (ref 8.8–10.2)
CHLORIDE SERPL-SCNC: 103 MMOL/L (ref 98–107)
CHOLEST SERPL-MCNC: 185 MG/DL
CREAT SERPL-MCNC: 0.82 MG/DL (ref 0.67–1.17)
DEPRECATED HCO3 PLAS-SCNC: 29 MMOL/L (ref 22–29)
ERYTHROCYTE [DISTWIDTH] IN BLOOD BY AUTOMATED COUNT: 11.8 % (ref 10–15)
GFR SERPL CREATININE-BSD FRML MDRD: >90 ML/MIN/1.73M2
GLUCOSE SERPL-MCNC: 86 MG/DL (ref 70–99)
HCT VFR BLD AUTO: 45 % (ref 40–53)
HDLC SERPL-MCNC: 83 MG/DL
HGB BLD-MCNC: 15.5 G/DL (ref 13.3–17.7)
LDLC SERPL CALC-MCNC: 90 MG/DL
MCH RBC QN AUTO: 31.8 PG (ref 26.5–33)
MCHC RBC AUTO-ENTMCNC: 34.4 G/DL (ref 31.5–36.5)
MCV RBC AUTO: 92 FL (ref 78–100)
NONHDLC SERPL-MCNC: 102 MG/DL
PLATELET # BLD AUTO: 169 10E3/UL (ref 150–450)
POTASSIUM SERPL-SCNC: 4.1 MMOL/L (ref 3.4–5.3)
PROT SERPL-MCNC: 7.7 G/DL (ref 6.4–8.3)
PSA SERPL-MCNC: 0.53 NG/ML (ref 0–6.5)
RBC # BLD AUTO: 4.88 10E6/UL (ref 4.4–5.9)
SODIUM SERPL-SCNC: 140 MMOL/L (ref 136–145)
TRIGL SERPL-MCNC: 58 MG/DL
WBC # BLD AUTO: 4.9 10E3/UL (ref 4–11)

## 2022-10-07 PROCEDURE — 80061 LIPID PANEL: CPT

## 2022-10-07 PROCEDURE — 36415 COLL VENOUS BLD VENIPUNCTURE: CPT

## 2022-10-07 PROCEDURE — G0103 PSA SCREENING: HCPCS

## 2022-10-07 PROCEDURE — 80053 COMPREHEN METABOLIC PANEL: CPT

## 2022-10-07 PROCEDURE — 85027 COMPLETE CBC AUTOMATED: CPT

## 2022-10-07 NOTE — TELEPHONE ENCOUNTER
"10-7-22  Pt called stated he is trying to get ahold a \"gladys\"  Pt wouldn't let me assist him  krsytal    "

## 2022-10-11 ENCOUNTER — TELEPHONE (OUTPATIENT)
Dept: INTERNAL MEDICINE | Facility: CLINIC | Age: 77
End: 2022-10-11

## 2022-10-11 NOTE — TELEPHONE ENCOUNTER
Patient brought in his healthcare directive to review.  I did review it.  Patient is still full code, but expresses a wish to be DNR or heroic life prolonging measures if poor prognosis or catastrophic/terminal diagnoses.  Otherwise he wishes full care.  If he is terminal he does not want CPR or artificial food.    Healthcare directive sent to be scanned into EHR

## 2022-10-13 ENCOUNTER — DOCUMENTATION ONLY (OUTPATIENT)
Dept: OTHER | Facility: CLINIC | Age: 77
End: 2022-10-13

## 2022-11-14 ENCOUNTER — TRANSFERRED RECORDS (OUTPATIENT)
Dept: HEALTH INFORMATION MANAGEMENT | Facility: CLINIC | Age: 77
End: 2022-11-14

## 2023-01-07 ENCOUNTER — TRANSFERRED RECORDS (OUTPATIENT)
Dept: HEALTH INFORMATION MANAGEMENT | Facility: CLINIC | Age: 78
End: 2023-01-07

## 2023-03-20 NOTE — PROGRESS NOTES
Problem: At Risk for Falls  Goal: # Patient does not fall  Outcome: Outcome Met, Continue evaluating goal progress toward completion  Goal: # Takes action to control fall-related risks  Outcome: Outcome Met, Continue evaluating goal progress toward completion  Goal: # Verbalizes understanding of fall risk/precautions  Description: Document education using the patient education activity  Outcome: Outcome Met, Continue evaluating goal progress toward completion     Problem: At Risk for Injury Due to Fall  Goal: # Patient does not fall  Outcome: Outcome Met, Continue evaluating goal progress toward completion  Goal: # Takes action to control condition specific risks  Outcome: Outcome Met, Continue evaluating goal progress toward completion  Goal: # Verbalizes understanding of fall-related injury personal risks  Description: Document education using the patient education activity  Outcome: Outcome Met, Continue evaluating goal progress toward completion     Problem: Impaired Physical Mobility  Goal: # Bed mobility, ambulation, and ADLs are maintained or returned to baseline during hospitalization  Outcome: Outcome Met, Continue evaluating goal progress toward completion      [facility] Follow Up Note    Niranjan Dodge   72 y.o. male    Date of Visit: 9/20/2017    Chief Complaint   Patient presents with     Hyperlipidemia     Hand Pain     right thumb injury     Subjective  Niranjan is here for follow-up on his hypercholesterolemia.  At his physical last month his LDL cholesterol is up to 185.  HDL 78.  He has not been on cholesterol medication.    Overall his low cardiovascular risk of a non-smoker with normal blood pressure.    He has not been very active this summer.  He was drinking more alcohol with 2:58 in the evening, has now cut down to 2 at night.    He was not very active this summer, now walking 40 minutes a day.  Weight is down 1 pound.    He is trying to eat a Mediterranean type diet, has increased his vegetable intake.  He denies eating that much fast food or processed meat before, however.    In 2017 HDL 73/.  2015 LDL is 155.  2014 LDL was 132.    He did meet with the sleep clinic earlier this month and has a sleep study set up to see if he will try CPAP for sleep apnea.  His oral appliance is not working as well as it had previously.    Past history of erectile dysfunction on Viagra and he requests a refill.    He has a new complaint of right thumb pain, 1 month ago he fell off a dog and jammed his right thumb as he fell.  Exacerbated by playing golf.  Pain at the base of his thumb with a dull achiness.  He has good range of motion, no bruising, good  strength.  It is improving significantly now.  He is not using any pain medicine.    PMHx:  No past medical history on file.  PSHx:  No past surgical history on file.  Immunizations:   Immunization History   Administered Date(s) Administered     Influenza, seasonal,quad inj 6-35 mos 09/20/2012     Pneumo Conj 13-V (2010&after) 06/08/2015     Pneumo Polysac 23-V 06/02/2014     Tdap 06/30/2016     ZOSTER 05/07/2012       ROS A comprehensive review of systems was performed and was  "otherwise negative    Medications, allergies, and problem list were reviewed and updated    Exam  /68 (Patient Site: Left Arm, Patient Position: Sitting, Cuff Size: Adult Regular)  Pulse (!) 56  Temp 97.7  F (36.5  C) (Oral)   Resp 14  Ht 5' 10\" (1.778 m)  Wt 193 lb (87.5 kg)  SpO2 97%  BMI 27.69 kg/m2  His right thumb has full range of motion with really minimal tenderness around the joint itself.  No bony tenderness or fracture.  No pain along the tendon.    Heart is regular without murmur.    Assessment/Plan  1. Hypercholesterolemia  Mediterranean diet discussed.  Continue regular activity.  Has moderated alcohol.    Low cardiovascular risk profile and not planning statin drug.  If LDL cholesterol still above 160, will work harder on Mediterranean diet through the winter down in [place] and recheck in the spring.  Otherwise, one-year recheck and physical.  - Lipid Cascade    2. Sleep apnea, unspecified type  Await sleep study, evaluate for starting CPAP    Reducing alcohol, as above    3. Erectile dysfunction, unspecified erectile dysfunction type  Refill given  - sildenafil (VIAGRA) 50 MG tablet; Take 1 tablet (50 mg total) by mouth daily as needed for erectile dysfunction.  Dispense: 30 tablet; Refill: 3    He has already had a flu shot this season her graph history of basal cell cancer with six-month follow-up this winter down in [place]    Right thumb pain with strain of joint, now improving.  No evidence of fracture or tendon injury.  Ibuprofen as needed    Return in about 11 months (around 8/20/2018) for Annual physical.   There are no Patient Instructions on file for this visit.  Herberth Duffy MD      Current Outpatient Prescriptions   Medication Sig Dispense Refill     aspirin 81 mg chewable tablet Chew 81 mg daily.       sildenafil (VIAGRA) 50 MG tablet Take 1 tablet (50 mg total) by mouth daily as needed for erectile dysfunction. 30 tablet 3     OMEGA-3/DHA/EPA/FISH OIL (FISH OIL-OMEGA-3 " FATTY ACIDS) 300-1,000 mg capsule Take 2 g by mouth daily.       No current facility-administered medications for this visit.      No Known Allergies  Social History   Substance Use Topics     Smoking status: Never Smoker     Smokeless tobacco: None     Alcohol use None

## 2023-04-13 ENCOUNTER — TRANSFERRED RECORDS (OUTPATIENT)
Dept: HEALTH INFORMATION MANAGEMENT | Facility: CLINIC | Age: 78
End: 2023-04-13
Payer: MEDICARE

## 2023-05-01 ENCOUNTER — TRANSFERRED RECORDS (OUTPATIENT)
Dept: HEALTH INFORMATION MANAGEMENT | Facility: CLINIC | Age: 78
End: 2023-05-01
Payer: MEDICARE

## 2023-05-04 ENCOUNTER — TRANSFERRED RECORDS (OUTPATIENT)
Dept: HEALTH INFORMATION MANAGEMENT | Facility: CLINIC | Age: 78
End: 2023-05-04

## 2023-05-15 ENCOUNTER — TELEPHONE (OUTPATIENT)
Dept: SLEEP MEDICINE | Facility: CLINIC | Age: 78
End: 2023-05-15
Payer: MEDICARE

## 2023-05-15 NOTE — TELEPHONE ENCOUNTER
Reason for Call:  Appointment Request    Patient requesting this type of appt:  Sleep evaluation, sleep apnea, has a cpap and the inspire    Requested provider: Dr [place] Martines    Reason patient unable to be scheduled: Not within requested timeframe    When does patient want to be seen/preferred time: 1-2 weeks    Comments: Patient has Medicare and would like to establish care with Dr Martines. With the new changes, the consult would need to be in person instead of virtual. Patient declined seeing somebody else.    Could we send this information to you in [employer] or would you prefer to receive a phone call?:   Patient would prefer a phone call   Okay to leave a detailed message?: Yes at Cell number on file:    Telephone Information:   Mobile [phone]       Call taken on 5/15/2023 at 10:04 AM by Katya Antonio

## 2023-05-15 NOTE — TELEPHONE ENCOUNTER
Provider's schedule is changing to research and RBD patients. Patient will need to call back to schedule with another provider for sleep apnea if they would like to be seen by [facility].

## 2023-09-22 ENCOUNTER — MEDICAL CORRESPONDENCE (OUTPATIENT)
Dept: HEALTH INFORMATION MANAGEMENT | Facility: CLINIC | Age: 78
End: 2023-09-22
Payer: MEDICARE

## 2023-09-27 ENCOUNTER — TRANSFERRED RECORDS (OUTPATIENT)
Dept: HEALTH INFORMATION MANAGEMENT | Facility: CLINIC | Age: 78
End: 2023-09-27
Payer: MEDICARE

## 2023-10-09 ENCOUNTER — OFFICE VISIT (OUTPATIENT)
Dept: INTERNAL MEDICINE | Facility: CLINIC | Age: 78
End: 2023-10-09
Payer: MEDICARE

## 2023-10-09 VITALS
DIASTOLIC BLOOD PRESSURE: 80 MMHG | SYSTOLIC BLOOD PRESSURE: 108 MMHG | BODY MASS INDEX: 27.81 KG/M2 | RESPIRATION RATE: 16 BRPM | OXYGEN SATURATION: 98 % | WEIGHT: 187.8 LBS | TEMPERATURE: 97.7 F | HEIGHT: 69 IN | HEART RATE: 56 BPM

## 2023-10-09 DIAGNOSIS — Z86.0100 HISTORY OF COLONIC POLYPS: ICD-10-CM

## 2023-10-09 DIAGNOSIS — N52.9 ERECTILE DYSFUNCTION, UNSPECIFIED ERECTILE DYSFUNCTION TYPE: ICD-10-CM

## 2023-10-09 DIAGNOSIS — Z85.828 HISTORY OF BASAL CELL CARCINOMA (BCC) OF SKIN: ICD-10-CM

## 2023-10-09 DIAGNOSIS — E78.00 HYPERCHOLESTEROLEMIA: ICD-10-CM

## 2023-10-09 DIAGNOSIS — G47.33 OSA (OBSTRUCTIVE SLEEP APNEA): ICD-10-CM

## 2023-10-09 DIAGNOSIS — Z00.00 MEDICARE ANNUAL WELLNESS VISIT, SUBSEQUENT: Primary | ICD-10-CM

## 2023-10-09 DIAGNOSIS — Z51.81 ENCOUNTER FOR THERAPEUTIC DRUG MONITORING: ICD-10-CM

## 2023-10-09 DIAGNOSIS — Z12.5 SCREENING FOR PROSTATE CANCER: ICD-10-CM

## 2023-10-09 LAB
ALBUMIN SERPL BCG-MCNC: 4.6 G/DL (ref 3.5–5.2)
ALP SERPL-CCNC: 75 U/L (ref 40–129)
ALT SERPL W P-5'-P-CCNC: 17 U/L (ref 0–70)
ANION GAP SERPL CALCULATED.3IONS-SCNC: 12 MMOL/L (ref 7–15)
AST SERPL W P-5'-P-CCNC: 31 U/L (ref 0–45)
BILIRUB SERPL-MCNC: 1.4 MG/DL
BUN SERPL-MCNC: 10 MG/DL (ref 8–23)
CALCIUM SERPL-MCNC: 9.4 MG/DL (ref 8.8–10.2)
CHLORIDE SERPL-SCNC: 105 MMOL/L (ref 98–107)
CHOLEST SERPL-MCNC: 180 MG/DL
CREAT SERPL-MCNC: 0.77 MG/DL (ref 0.67–1.17)
DEPRECATED HCO3 PLAS-SCNC: 26 MMOL/L (ref 22–29)
EGFRCR SERPLBLD CKD-EPI 2021: >90 ML/MIN/1.73M2
ERYTHROCYTE [DISTWIDTH] IN BLOOD BY AUTOMATED COUNT: 11.9 % (ref 10–15)
GLUCOSE SERPL-MCNC: 88 MG/DL (ref 70–99)
HCT VFR BLD AUTO: 45.4 % (ref 40–53)
HDLC SERPL-MCNC: 81 MG/DL
HGB BLD-MCNC: 15.1 G/DL (ref 13.3–17.7)
LDLC SERPL CALC-MCNC: 87 MG/DL
MCH RBC QN AUTO: 31.4 PG (ref 26.5–33)
MCHC RBC AUTO-ENTMCNC: 33.3 G/DL (ref 31.5–36.5)
MCV RBC AUTO: 94 FL (ref 78–100)
NONHDLC SERPL-MCNC: 99 MG/DL
PLATELET # BLD AUTO: 155 10E3/UL (ref 150–450)
POTASSIUM SERPL-SCNC: 4.4 MMOL/L (ref 3.4–5.3)
PROT SERPL-MCNC: 7.6 G/DL (ref 6.4–8.3)
PSA SERPL DL<=0.01 NG/ML-MCNC: 0.37 NG/ML (ref 0–6.5)
RBC # BLD AUTO: 4.81 10E6/UL (ref 4.4–5.9)
SODIUM SERPL-SCNC: 143 MMOL/L (ref 135–145)
TRIGL SERPL-MCNC: 60 MG/DL
WBC # BLD AUTO: 5 10E3/UL (ref 4–11)

## 2023-10-09 PROCEDURE — 80053 COMPREHEN METABOLIC PANEL: CPT | Performed by: INTERNAL MEDICINE

## 2023-10-09 PROCEDURE — G0103 PSA SCREENING: HCPCS | Performed by: INTERNAL MEDICINE

## 2023-10-09 PROCEDURE — 99214 OFFICE O/P EST MOD 30 MIN: CPT | Mod: 25 | Performed by: INTERNAL MEDICINE

## 2023-10-09 PROCEDURE — G0439 PPPS, SUBSEQ VISIT: HCPCS | Performed by: INTERNAL MEDICINE

## 2023-10-09 PROCEDURE — 36415 COLL VENOUS BLD VENIPUNCTURE: CPT | Performed by: INTERNAL MEDICINE

## 2023-10-09 PROCEDURE — 80061 LIPID PANEL: CPT | Performed by: INTERNAL MEDICINE

## 2023-10-09 PROCEDURE — 85027 COMPLETE CBC AUTOMATED: CPT | Performed by: INTERNAL MEDICINE

## 2023-10-09 RX ORDER — GABAPENTIN 100 MG/1
300 CAPSULE ORAL 3 TIMES DAILY
COMMUNITY
Start: 2023-09-08 | End: 2024-03-25

## 2023-10-09 RX ORDER — TADALAFIL 10 MG/1
10 TABLET ORAL DAILY PRN
Qty: 10 TABLET | Refills: 4 | Status: SHIPPED | OUTPATIENT
Start: 2023-10-09 | End: 2024-08-15

## 2023-10-09 RX ORDER — ATORVASTATIN CALCIUM 10 MG/1
10 TABLET, FILM COATED ORAL DAILY
Qty: 90 TABLET | Refills: 3 | Status: SHIPPED | OUTPATIENT
Start: 2023-10-09 | End: 2024-03-25

## 2023-10-09 RX ORDER — TADALAFIL 10 MG/1
10 TABLET ORAL DAILY PRN
Qty: 10 TABLET | Refills: 4 | Status: SHIPPED | OUTPATIENT
Start: 2023-10-09 | End: 2023-10-09

## 2023-10-09 ASSESSMENT — ENCOUNTER SYMPTOMS
SHORTNESS OF BREATH: 0
PARESTHESIAS: 0
CONSTIPATION: 0
PALPITATIONS: 0
DYSURIA: 0
FEVER: 0
HEADACHES: 1
NAUSEA: 0
HEMATOCHEZIA: 0
CHILLS: 0
EYE PAIN: 0
DIZZINESS: 1
COUGH: 0
WEAKNESS: 0
MYALGIAS: 1
ABDOMINAL PAIN: 0
HEMATURIA: 0
DIARRHEA: 0
SORE THROAT: 0
HEARTBURN: 0
NERVOUS/ANXIOUS: 0
ARTHRALGIAS: 1
FREQUENCY: 1
JOINT SWELLING: 1

## 2023-10-09 ASSESSMENT — ACTIVITIES OF DAILY LIVING (ADL): CURRENT_FUNCTION: NO ASSISTANCE NEEDED

## 2023-10-09 NOTE — PATIENT INSTRUCTIONS
Continue to work with physical therapy and stretching exercises for your neck.    Continue work at the sleep clinic regarding your inspire machine.    Proceed with the COVID-vaccine next week as planned.  Otherwise you are up-to-date with vaccines.    You can shop around at different pharmacies for the best price on your Cialis medication.    See dermatology later this fall, you do have suspicious skin lesion behind your right ear and your left arm.    See the ophthalmologist yearly for glaucoma screening and retina check, as you are doing.    Your colonoscopy will be due September 2024.    Continue to stay active and walk on a regular basis.  At least 20 minutes of activity on a daily basis is recommended.    See me in 1 year for adult wellness visit physical exam.    Look at your [place] [facility] computer portal for today's lab results.

## 2023-10-09 NOTE — PROGRESS NOTES
SUBJECTIVE:   Niranjan is a 78 year old who presents for adult wellness visit and follow-up on multiple medical issues.    Patient lives independently but does go to [place] for the winter and will be heading there in a week and a half.    He is active and walks almost every day for about 3 miles, good exertional ability.  No chest pain or increasing shortness of breath.  No lower extremity edema.  No palpitations.    He had some ingrown toenails addressed recently but those are healing well.    He has obstructive sleep apnea of moderate to severe, previous uvuloplasty, previously had tolerated CPAP.  He had the inspire implant with difficulty tolerating initially, but recently had a sleep study September 22, 2023 that showed his sleep disordered breathing is nicely managed with current inspire treatment.  Sparse breakthrough snoring was observed, now patient is tolerating his inspire well for the last few weeks.  Minimal daytime sleepiness.    No palpitations or history of arrhythmia.  Minimal daytime sleepiness.  Normal brain MRI June 2022.    Some previous alcohol.  Previous essential tremor but no longer using Inderal LA.    Had some headache issues and neck pain but seen physical therapy and that is improved.    Past history of hypercholesterolemia but well controlled last year on 10 mg of atorvastatin and he is not having generalized myalgias syndrome.  No history of liver problems.    Previous colon polyps but colonoscopy September 2019 showed just a hyperplastic polyp, he is still on a 5-year plan.  Denies change in bowels or blood in stool.  No abdominal pain.    No new urinary symptoms, 1 time nocturia stable.    History of basal cell cancer and he has some new skin lesion on his left arm and behind his right ear, but sees dermatology in a week and a half in [place].  He saw dermatology in the spring and no new skin cancers at that time.    He saw the ophthalmologist sometime this summer and denies any  "glaucoma problems.    No new cough    Erectile dysfunction, has used Viagra in the past, requesting Cialis this time.    He just had the RSV vaccine and has the COVID-vaccine scheduled for next week.  He already got the flu shot.          10/9/2023     7:15 AM   Additional Questions   Roomed by Radha FONTANEZ   Accompanied by qasim       Are you in the first 12 months of your Medicare coverage?  No    Healthy Habits:     In general, how would you rate your overall health?  Good    Frequency of exercise:  2-3 days/week    Duration of exercise:  Greater than 60 minutes    Do you usually eat at least 4 servings of fruit and vegetables a day, include whole grains    & fiber and avoid regularly eating high fat or \"junk\" foods?  Yes    Taking medications regularly:  Yes    Medication side effects:  None, Muscle aches and Lightheadedness    Ability to successfully perform activities of daily living:  No assistance needed    Home Safety:  Lack of grab bars in the bathroom    Hearing Impairment:  No hearing concerns    In the past 6 months, have you been bothered by leaking of urine? Yes    In general, how would you rate your overall mental or emotional health?  Good    Additional concerns today:  Yes      Today's PHQ-2 Score:       10/9/2023     7:30 AM   PHQ-2 ( 1999 [employer])   Q1: Little interest or pleasure in doing things 0   Q2: Feeling down, depressed or hopeless 0   PHQ-2 Score 0   Q1: Little interest or pleasure in doing things Not at all   Q2: Feeling down, depressed or hopeless Not at all   PHQ-2 Score 0           Have you ever done Advance Care Planning? (For example, a Health Directive, POLST, or a discussion with a medical provider or your loved ones about your wishes): Patient is full code       Fall risk  Fallen 2 or more times in the past year?: No  Any fall with injury in the past year?: No    Cognitive Screening   1) Repeat 3 items (Leader, Season, Table)    2) Clock draw: ABNORMAL took some time, some confusion  3) " "3 item recall: Recalls 3 objects  Results: 3 items recalled: COGNITIVE IMPAIRMENT LESS LIKELY    Mini-CogTM Copyright YADIRA Richards. Licensed by the author for use in [facility]; reprinted with permission (pamela@.[email or web address]). All rights reserved.      Do you have sleep apnea, excessive snoring or daytime drowsiness? : yes    Reviewed and updated as needed this visit by clinical staff   Tobacco  Allergies  Meds              Reviewed and updated as needed this visit by Provider                 Social History     Tobacco Use     Smoking status: Never     Smokeless tobacco: Never   Substance Use Topics     Alcohol use: Yes     Alcohol/week: 14.0 standard drinks of alcohol     Types: 14 Shots of liquor per week     Comment: Drinks \"2 scotch per day.\" His partner said he has 3 per day.             10/9/2023     7:29 AM   Alcohol Use   Prescreen: >3 drinks/day or >7 drinks/week? Yes   AUDIT SCORE  5     Do you have a current opioid prescription? No  Do you use any other controlled substances or medications that are not prescribed by a provider? None              Current providers sharing in care for this patient include:   Patient Care Team:  Herberth Duffy MD as PCP - General (Internal Medicine)  Herberth Duffy MD as Assigned PCP    The following health maintenance items are reviewed in Epic and correct as of today:  Health Maintenance   Topic Date Due     ANNUAL REVIEW OF HM ORDERS  Never done     HEPATITIS C SCREENING  Never done     COVID-19 Vaccine (4 - 2023-24 season) 09/01/2023     MEDICARE ANNUAL WELLNESS VISIT  10/06/2023     FALL RISK ASSESSMENT  10/09/2024     DTAP/TDAP/TD IMMUNIZATION (2 - Td or Tdap) 06/30/2026     LIPID  10/07/2027     ADVANCE CARE PLANNING  10/13/2027     COLORECTAL CANCER SCREENING  09/24/2029     PHQ-2 (once per calendar year)  Completed     INFLUENZA VACCINE  Completed     Pneumococcal Vaccine: 65+ Years  Completed     ZOSTER IMMUNIZATION  Completed     IPV IMMUNIZATION  Aged Out " "    HPV IMMUNIZATION  Aged Out     MENINGITIS IMMUNIZATION  Aged Out     Current Outpatient Medications   Medication Sig Dispense Refill     atorvastatin (LIPITOR) 10 MG tablet Take 1 tablet (10 mg) by mouth daily 90 tablet 3     gabapentin (NEURONTIN) 100 MG capsule Take 300 mg by mouth 3 times daily       tadalafil (CIALIS) 10 MG tablet Take 1 tablet (10 mg) by mouth daily as needed (Erectile dysfunction) 10 tablet 4     TURMERIC CURCUMIN PO Take 750 mg by mouth daily       propranolol ER (INDERAL LA) 60 MG 24 hr capsule Take 1 capsule (60 mg) by mouth daily (Patient not taking: Reported on 10/9/2023) 90 capsule 3     Allergies   Allergen Reactions     Strawberry Flavor Hives             Review of Systems   Constitutional:  Negative for chills and fever.   HENT:  Negative for congestion, ear pain, hearing loss and sore throat.    Eyes:  Negative for pain and visual disturbance.   Respiratory:  Negative for cough and shortness of breath.    Cardiovascular:  Negative for chest pain, palpitations and peripheral edema.   Gastrointestinal:  Negative for abdominal pain, constipation, diarrhea, heartburn, hematochezia and nausea.   Genitourinary:  Positive for frequency, impotence and urgency. Negative for dysuria, genital sores, hematuria and penile discharge.   Musculoskeletal:  Positive for arthralgias, joint swelling and myalgias.   Skin:  Negative for rash.   Neurological:  Positive for dizziness and headaches. Negative for weakness and paresthesias.   Psychiatric/Behavioral:  Negative for mood changes. The patient is not nervous/anxious.          OBJECTIVE:   /80 (BP Location: Right arm, Patient Position: Sitting, Cuff Size: Adult Regular)   Pulse 56   Temp 97.7  F (36.5  C) (Oral)   Resp 16   Ht 1.75 m (5' 8.9\")   Wt 85.2 kg (187 lb 12.8 oz)   SpO2 98%   BMI 27.82 kg/m   Estimated body mass index is 27.82 kg/m  as calculated from the following:    Height as of this encounter: 1.75 m (5' 8.9\").    " Weight as of this encounter: 85.2 kg (187 lb 12.8 oz).  Physical Exam  Alert and oriented x3 with good mood and affect.  Normal cognition.  Normal mobility.  Pupils and irises equal and reactive.  Extraocular muscles intact.  No jaundice or conjunctivitis.  External ears and nose exam is normal.  He has moderate cerumen impaction on the right but the left is clear with normal tympanic membrane.  His pharynx has a uvuloplasty.  No other oral lesions or leukoplakia.  Teeth in good condition.  No cervical or supraclavicular or axillary adenopathy, no inguinal adenopathy.  He has scar tissue on his neck, not significantly tender to palpation.  No JVD and no carotid bruits.  No thyromegaly or nodularity.  Lungs are clear to auscultation with good respiratory excursion.  Heart is regular with no murmur rub or gallop.  No ankle edema and +1 pedal pulses.  His great toenails are healing well, no evidence of infection.  No preulcerative calluses.  His abdomen is thin, nontender and no hepatosplenomegaly or pulsatile abdominal mass.  Skin shows a sclerotic lesion behind his right ear and the left upper arm that was pointed out to patient.  He has multiple seborrheic keratoses.  No inguinal hernia.  Rectal exam shows a smooth symmetric prostate without nodule.    ASSESSMENT / PLAN:   (Z00.00) Medicare annual wellness visit, subsequent  (primary encounter diagnosis)  Comment: Main focus for patient is stroke prevention with his sleep apnea and hypercholesterolemia, but those are well controlled now and blood pressure has been normal.    Has good exercise routine and will continue that.    Patient is full code    Patient instructions:  Continue to work with physical therapy and stretching exercises for your neck.    Continue work at the sleep clinic regarding your inspire machine.    Proceed with the COVID-vaccine next week as planned.  Otherwise you are up-to-date with vaccines.    You can shop around at different pharmacies  for the best price on your Cialis medication.    See dermatology later this fall, you do have suspicious skin lesion behind your right ear and your left arm.    See the ophthalmologist yearly for glaucoma screening and retina check, as you are doing.    Your colonoscopy will be due September 2024.    Continue to stay active and walk on a regular basis.  At least 20 minutes of activity on a daily basis is recommended.    See me in 1 year for adult wellness visit physical exam.    Look at your [employer] computer portal for today's lab results.      (E78.00) Hypercholesterolemia  Comment: Moderate cardiovascular risk.  Goal LDL less than 130, tolerating current atorvastatin.  We did discuss muscle toxicity risk and risk for myalgias, which he denies.  I did discuss coenzyme Q 10 as a supplement that he could consider  Plan: atorvastatin (LIPITOR) 10 MG tablet, Lipid         Profile            (G47.33) SHA (obstructive sleep apnea)  Comment: Now tolerating inspire, no longer needing CPAP.  Has a sleep clinic to help him manage his inspire  Plan:     (Z86.010) History of colonic polyps  Comment: 5-year follow-up colonoscopy will be due September 2024  Plan:     (Z85.828) History of basal cell carcinoma (BCC) of skin  Comment: Skin lesions pointed out to patient.  He sees dermatology in a few weeks in [place]  Plan:     (Z12.5) Screening for prostate cancer  Comment: Yearly screening  Plan: Prostate Specific Antigen Screen            (Z51.81) Encounter for therapeutic drug monitoring  Comment:   Plan: CBC with platelets, Comprehensive metabolic         panel            (N52.9) Erectile dysfunction, unspecified erectile dysfunction type  Comment: Given a printed prescription and 1 sent to pharmacy, given good Rx card  Plan: tadalafil (CIALIS) 10 MG tablet, DISCONTINUED:         tadalafil (CIALIS) 10 MG tablet            Patient has been advised of split billing requirements and indicates understanding:  Yes      COUNSELING:  Reviewed preventive health counseling, as reflected in patient instructions       Regular exercise       Healthy diet/nutrition       Vision screening        He reports that he has never smoked. He has never used smokeless tobacco.      Appropriate preventive services were discussed with this patient, including applicable screening as appropriate for fall prevention, nutrition, physical activity, Tobacco-use cessation, weight loss and cognition.  Checklist reviewing preventive services available has been given to the patient.    Reviewed patients plan of care and provided an AVS. The Basic Care Plan (routine screening as documented in Health Maintenance) for Niranjan meets the Care Plan requirement. This Care Plan has been established and reviewed with the Patient.          Herberth Duffy MD  [facility]    Identified Health Risks:  I have reviewed Opioid Use Disorder and Substance Use Disorder risk factors and made any needed referrals.

## 2023-10-10 ENCOUNTER — TRANSFERRED RECORDS (OUTPATIENT)
Dept: HEALTH INFORMATION MANAGEMENT | Facility: CLINIC | Age: 78
End: 2023-10-10

## 2024-01-15 ENCOUNTER — TRANSFERRED RECORDS (OUTPATIENT)
Dept: HEALTH INFORMATION MANAGEMENT | Facility: CLINIC | Age: 79
End: 2024-01-15
Payer: MEDICARE

## 2024-01-16 ENCOUNTER — TRANSFERRED RECORDS (OUTPATIENT)
Dept: HEALTH INFORMATION MANAGEMENT | Facility: CLINIC | Age: 79
End: 2024-01-16
Payer: MEDICARE

## 2024-03-20 ENCOUNTER — TELEPHONE (OUTPATIENT)
Dept: INTERNAL MEDICINE | Facility: CLINIC | Age: 79
End: 2024-03-20
Payer: MEDICARE

## 2024-03-20 NOTE — TELEPHONE ENCOUNTER
General Call    Contacts         Type Contact Phone/Fax    03/20/2024 02:33 PM CDT Phone (Incoming) Niranjan Dodge (Self) [phone] (H)          Reason for Call:  Pt has been on Atorvastatin for 3 years and does not want to get dementia. He was told from two different doctors to be put off this medication. Wanting to get Rosuvastatin instead for replacement. Has been told the Rosuvastatin prevents dementia. Can this be done? Pt can wait until Dr Duffy is back to get this answered. Please advise.    Could we send this information to you in [employer] or would you prefer to receive a phone call?:   Patient would prefer a phone call   Okay to leave a detailed message?: Yes at Cell number on file:    Telephone Information:   Mobile [phone]

## 2024-03-21 NOTE — TELEPHONE ENCOUNTER
Ok to wait for Tati 3/25/24    Called pt, he will not be back in [place] until the end of May.    Wants to change his current Statin to a more safe med that wont cause Dementia. I offered appt with Dr Tati sung when he comes back in May and he doesn't want to wait that long. Please advise, thanks.

## 2024-03-22 NOTE — TELEPHONE ENCOUNTER
This will require  discussion with patient.  It can be discussed at a virtual visit.  Have patient make appointment

## 2024-03-25 ENCOUNTER — VIRTUAL VISIT (OUTPATIENT)
Dept: INTERNAL MEDICINE | Facility: CLINIC | Age: 79
End: 2024-03-25
Payer: MEDICARE

## 2024-03-25 DIAGNOSIS — E78.00 HYPERCHOLESTEROLEMIA: Primary | ICD-10-CM

## 2024-03-25 DIAGNOSIS — Z51.81 ENCOUNTER FOR THERAPEUTIC DRUG MONITORING: ICD-10-CM

## 2024-03-25 PROCEDURE — 99441 PR PHYSICIAN TELEPHONE EVALUATION 5-10 MIN: CPT | Mod: 93 | Performed by: INTERNAL MEDICINE

## 2024-03-25 RX ORDER — ROSUVASTATIN CALCIUM 5 MG/1
5 TABLET, COATED ORAL DAILY
Qty: 90 TABLET | Refills: 2 | Status: SHIPPED | OUTPATIENT
Start: 2024-03-25

## 2024-03-25 NOTE — PROGRESS NOTES
"Niranjan is a 78 year old who is being evaluated via a billable telephone visit.      Originating Location (pt. Location): Home    Distant Location (provider location):  On-site    Assessment & Plan     Hypercholesterolemia  Patient request change of atorvastatin over to rosuvastatin.  I did discuss that there is an unknown correlation with memory issues and statins.  Patient was told and is unclear if rosuvastatin is a less risky than atorvastatin.  I did discuss that both medications can reduce cardiovascular risk.    Patient wishes to change to rosuvastatin.  Patient was told if any new side effects develop to seek medical attention for evaluation and he will check fasting labs in the spring when he returns to [place].  Otherwise follow-up for his yearly physical in the fall.  - Lipid Profile  - rosuvastatin (CRESTOR) 5 MG tablet  Dispense: 90 tablet; Refill: 2    Encounter for therapeutic drug monitoring    - ALT  - AST  - CK total              BMI  Estimated body mass index is 27.82 kg/m  as calculated from the following:    Height as of 10/9/23: 1.75 m (5' 8.9\").    Weight as of 10/9/23: 85.2 kg (187 lb 12.8 oz).             Subjective   Niranjan is a 78 year old, presenting for the following health issues:  Medication Request (Pt would like to change from Atorvastatin to Rosuvastatin, has heard that Atorvastatin can cause dementia )      3/25/2024     6:49 AM   Additional Questions   Roomed by Dotty ZAMARRIPA     History of Present Illness       Reason for visit:  Medication request    He eats 2-3 servings of fruits and vegetables daily.He consumes 0 sweetened beverage(s) daily.He exercises with enough effort to increase his heart rate 20 to 29 minutes per day.  He exercises with enough effort to increase his heart rate 3 or less days per week.   He is taking medications regularly.                   Objective           Vitals:  No vitals were obtained today due to virtual visit.    Physical Exam   General: Alert and " no distress //Respiratory: No audible wheeze, cough, or shortness of breath // Psychiatric:  Appropriate affect, tone, and pace of words            Phone call duration: 5 minutes  Signed Electronically by: Herberth Duffy MD

## 2024-03-28 ENCOUNTER — OFFICE VISIT (OUTPATIENT)
Dept: URBAN - METROPOLITAN AREA CLINIC 44 | Facility: CLINIC | Age: 79
End: 2024-03-28
Payer: MEDICARE

## 2024-03-28 DIAGNOSIS — D48.19 OTH NEOPLASM OF UNCERTAIN BEHAVIOR OF CONNCTV/SOFT TISS: Primary | ICD-10-CM

## 2024-03-28 PROCEDURE — 67840 REMOVE EYELID LESION: CPT | Performed by: OPHTHALMOLOGY

## 2024-03-28 PROCEDURE — 99202 OFFICE O/P NEW SF 15 MIN: CPT | Performed by: OPHTHALMOLOGY

## 2024-05-20 ENCOUNTER — OFFICE VISIT (OUTPATIENT)
Dept: INTERNAL MEDICINE | Facility: CLINIC | Age: 79
End: 2024-05-20
Payer: MEDICARE

## 2024-05-20 VITALS
SYSTOLIC BLOOD PRESSURE: 144 MMHG | OXYGEN SATURATION: 96 % | BODY MASS INDEX: 28.73 KG/M2 | HEART RATE: 83 BPM | TEMPERATURE: 98 F | DIASTOLIC BLOOD PRESSURE: 90 MMHG | WEIGHT: 194 LBS | HEIGHT: 69 IN | RESPIRATION RATE: 16 BRPM

## 2024-05-20 DIAGNOSIS — E78.00 HYPERCHOLESTEROLEMIA: ICD-10-CM

## 2024-05-20 DIAGNOSIS — M48.02 CERVICAL STENOSIS OF SPINAL CANAL: Primary | ICD-10-CM

## 2024-05-20 DIAGNOSIS — Z86.0100 HISTORY OF COLONIC POLYPS: ICD-10-CM

## 2024-05-20 DIAGNOSIS — G47.33 OSA (OBSTRUCTIVE SLEEP APNEA): ICD-10-CM

## 2024-05-20 PROCEDURE — 99214 OFFICE O/P EST MOD 30 MIN: CPT | Performed by: INTERNAL MEDICINE

## 2024-05-20 NOTE — PATIENT INSTRUCTIONS
Schedule a fasting lab appointment sometime this spring.    Referrals have been placed to neurosurgery, but you would need to do your own referral work to get into [facility].  [facility] makes their own decisions on what patients to see.    Referral has been placed to rheumatology, you will need to get that referral to your rheumatologist of choice.    Referral has been made to physical therapy, you can bring that order to what ever physical therapy assist you choose.  But check with your insurance.    You are due for a colonoscopy in September of this year.    You are due for an adult wellness visit physical exam after October 9.

## 2024-05-20 NOTE — PROGRESS NOTES
Niranjan Dodge   79 year old male    Date of Visit: 5/20/2024    Chief Complaint   Patient presents with    Results     Review MRI results and requesting a referral to [facility].     Subjective  79-year-old male with 1-2-year history of chronic neck pain.  He had some shoulder pain in the past to his upper shoulders but not down his arms.  Previous right wrist injury over 10 years ago but that is stable.    January 17 of this year he had a cervical MRI which I reviewed with patient.  He had this done in [place].  The MRI showed a disc bulge with moderate central canal stenosis at C3-4 and also some moderate central canal stenosis at C4-5 more associated with degenerative arthritis.  No previous surgery on the neck.  He did have a right C3-4 epidural steroid shot on May 9 that he did not feel helped.    Patient has seen physical therapy in the past but not always doing his exercises for the neck.    Patient also wants to see a rheumatologist that he identified at the [facility] system.    No history of autoimmune or inflammatory type arthritis.    He does have moderate to severe sleep apnea but has the inspire implant which was working as of last year.    Patient was on atorvastatin but changed to rosuvastatin because of concerns over possible future dementia risk with statins.  He did not have any new muscle aches or side effects after changing that medication.  Cholesterol is well-controlled last October and normal kidney and liver tests on Lipitor.    Patient is active with regular walking.    Patient wanted a referral to [facility] neurosurgery for his neck    History of colon polyps with his 5-year colonoscopy due this September    PMHx:    Past Medical History:   Diagnosis Date    BCC (basal cell carcinoma), face     Colonic fistula     Gastroesophageal reflux disease without esophagitis     Hypercholesterolemia     Other male erectile dysfunction     Schwannoma - Base of throat/tongue     Sleep  "apnea      PSHx:    Past Surgical History:   Procedure Laterality Date    Colon Surgery due to Fistula      Left Knee Arthroscopic Surgery Left     Schwannoma Removal      Inferior aspect of the tongue    SKIN CANCER EXCISION Left     To remove basal cell CA from left lower eyelid     Immunizations:   Immunization History   Administered Date(s) Administered    COVID-19 12+ (2023-24) (MODERNA) 10/18/2023    COVID-19 Bivalent 18+ (Moderna) 09/26/2022    COVID-19 MONOVALENT 12+ (Pfizer) 10/04/2021    COVID-19 Monovalent 18+ (Moderna) 04/18/2022    Flu, Unspecified 09/09/2020    Influenza (High Dose) 3 valent vaccine 10/05/2013, 09/14/2017, 09/11/2018, 08/30/2019, 09/19/2022    Influenza (IIV3) PF 09/20/2012, 10/22/2014    Influenza Vaccine 65+ (FLUAD) 09/26/2023    Influenza Vaccine 65+ (Fluzone HD) 09/09/2021, 09/18/2022, 09/26/2023    Influenza Vaccine >6 months,quad, PF 09/20/2012    Influenza Vaccine, 6+MO IM (QUADRIVALENT W/PRESERVATIVES) 09/20/2012    Influenza, seasonal, injectable, PF 09/23/2016    Pneumo Conj 13-V (2010&after) 06/08/2015    Pneumococcal 23 valent 06/02/2014    RSV Vaccine (Arexvy) 10/04/2023    TDAP (Adacel,Boostrix) 06/30/2016    Zoster recombinant adjuvanted (SHINGRIX) 01/02/2020, 05/26/2020    Zoster vaccine, live 05/07/2012       ROS A comprehensive review of systems was performed and was otherwise negative    Medications, allergies, and problem list were reviewed and updated    Exam  BP (!) 144/90   Pulse 83   Temp 98  F (36.7  C)   Resp 16   Ht 1.75 m (5' 8.9\")   Wt 88 kg (194 lb)   SpO2 96%   BMI 28.73 kg/m    Lungs are clear to auscultation.  Heart is regular without murmur.  No edema.  His neck is without significant midline tenderness.  There is some mild tenderness to the paracervical muscles bilaterally.  Full range of motion of arms bilaterally and normal  strength.  Just some mild degenerative changes in his right wrist.  Gait is normal.    Assessment/Plan  1. Cervical " stenosis of spinal canal  Having some mild chronic neck pain more suggestive of DJD arthritis.  Not clear radicular signs and not signs of lower extremity effects from spinal stenosis.    Spinal stenosis was just felt to be moderate and not severe.  I do not see indication for surgical intervention but patient is requesting a referral to neurosurgery at [facility].  Referral was placed for patient.  I did explain to patient that he will need to be in charge of contacting [facility] and determining if he can be seen there.  I did explain that therapy does not have schedulers available for referral to [facility].    He also requests a referral to rheumatology at the [facility] system, and he was told that he would need to be in charge of making that appointment as well.    He also identified an outside physical therapy location that he wanted to go to, he was also told that he would need to set that up himself.  I encouraged him to do the neck exercises regularly from now on.  Avoid pain medication that would cover up pain and may lead to further injury.  - Adult Neurosurgery  Referral; Future  - Adult Rheumatology  Referral; Future  - Physical Therapy  Referral; Future    2. Hypercholesterolemia  Patient is now on rosuvastatin.  I did encourage him to do the fasting labs that were already ordered.  Otherwise I will see him in the fall for his physical    3. SHA (obstructive sleep apnea)  Inspire implant    4. History of colonic polyps  Colonoscopy due this September  - Colonoscopy Screening  Referral; Future      Return in about 5 months (around 10/10/2024) for Adult wellness visit physical exam.   Patient Instructions   Schedule a fasting lab appointment sometime this spring.    Referrals have been placed to neurosurgery, but you would need to do your own referral work to get into [facility].  [facility] makes their own decisions on what patients to see.    Referral has  "been placed to rheumatology, you will need to get that referral to your rheumatologist of choice.    Referral has been made to physical therapy, you can bring that order to what ever physical therapy assist you choose.  But check with your insurance.    You are due for a colonoscopy in September of this year.    You are due for an adult wellness visit physical exam after October 9.    Herberth Duffy MD, MD        Current Outpatient Medications   Medication Sig Dispense Refill    rosuvastatin (CRESTOR) 5 MG tablet Take 1 tablet (5 mg) by mouth daily 90 tablet 2    tadalafil (CIALIS) 10 MG tablet Take 1 tablet (10 mg) by mouth daily as needed (Erectile dysfunction) (Patient not taking: Reported on 5/20/2024) 10 tablet 4     Allergies   Allergen Reactions    Strawberry Flavor Hives     Social History     Tobacco Use    Smoking status: Never     Passive exposure: Never    Smokeless tobacco: Never   Vaping Use    Vaping status: Never Used   Substance Use Topics    Alcohol use: Yes     Alcohol/week: 14.0 standard drinks of alcohol     Types: 14 Shots of liquor per week     Comment: Drinks \"2 scotch per day.\" His partner said he has 3 per day.    Drug use: Never             Subjective   Niranjan is a 79 year old, presenting for the following health issues:  Results (Review MRI results and requesting a referral to [facility].)      5/20/2024     2:14 PM   Additional Questions   Roomed by Divya PRIETO   Accompanied by qasim     History of Present Illness       Reason for visit:  3    He eats 2-3 servings of fruits and vegetables daily.He consumes 0 sweetened beverage(s) daily.He exercises with enough effort to increase his heart rate 30 to 60 minutes per day.  He exercises with enough effort to increase his heart rate 4 days per week.   He is taking medications regularly.                     Objective    There were no vitals taken for this visit.  There is no height or weight on file to calculate BMI.  Physical Exam           "     Signed Electronically by: Herberth Duffy MD

## 2024-05-22 ENCOUNTER — TRANSFERRED RECORDS (OUTPATIENT)
Dept: HEALTH INFORMATION MANAGEMENT | Facility: CLINIC | Age: 79
End: 2024-05-22
Payer: MEDICARE

## 2024-05-24 ENCOUNTER — TELEPHONE (OUTPATIENT)
Dept: INTERNAL MEDICINE | Facility: CLINIC | Age: 79
End: 2024-05-24

## 2024-05-24 NOTE — TELEPHONE ENCOUNTER
General Call    Contacts         Type Contact Phone/Fax    05/24/2024 09:52 AM CDT Phone (Incoming) Niranjan Dodge EASTON (Self) [phone] (H)          Reason for Call:  Colonoscopy Referral     What are your questions or concerns:  pt states he would like order to go to [facility] -where he had this done last time.    Order routed to [facility] by Writer.

## 2024-06-04 ENCOUNTER — TELEPHONE (OUTPATIENT)
Dept: NURSING | Facility: CLINIC | Age: 79
End: 2024-06-04
Payer: MEDICARE

## 2024-06-04 NOTE — TELEPHONE ENCOUNTER
Telephone call   Patient calling to report that he has a appointment with Dr Duffy on 6/6/2024 they will be doing some lab work and he wanted to report that his neurologist  in the last   last 2 weeks has started him on propranolol 60 mg extended relief capsule.  They  are using it to calm his tremors. His questions is should he stop taking this med for 2 days before he has the lab work drawn?  Routing to PCP.    Krista Joiner RN   [facility] Nurse Advisor  8:19 AM 6/4/2024

## 2024-06-04 NOTE — TELEPHONE ENCOUNTER
Outgoing call to patient. Relayed PCP's message. Patient verbalized understanding. No further questions/concerns at this time.

## 2024-06-06 ENCOUNTER — LAB (OUTPATIENT)
Dept: LAB | Facility: CLINIC | Age: 79
End: 2024-06-06
Payer: MEDICARE

## 2024-06-06 DIAGNOSIS — E78.00 HYPERCHOLESTEROLEMIA: ICD-10-CM

## 2024-06-06 DIAGNOSIS — Z51.81 ENCOUNTER FOR THERAPEUTIC DRUG MONITORING: ICD-10-CM

## 2024-06-06 LAB
ALT SERPL W P-5'-P-CCNC: 22 U/L (ref 0–70)
AST SERPL W P-5'-P-CCNC: 26 U/L (ref 0–45)
CHOLEST SERPL-MCNC: 187 MG/DL
CK SERPL-CCNC: 51 U/L (ref 39–308)
FASTING STATUS PATIENT QL REPORTED: YES
HDLC SERPL-MCNC: 83 MG/DL
LDLC SERPL CALC-MCNC: 87 MG/DL
NONHDLC SERPL-MCNC: 104 MG/DL
TRIGL SERPL-MCNC: 86 MG/DL

## 2024-06-06 PROCEDURE — 84450 TRANSFERASE (AST) (SGOT): CPT

## 2024-06-06 PROCEDURE — 36415 COLL VENOUS BLD VENIPUNCTURE: CPT

## 2024-06-06 PROCEDURE — 80061 LIPID PANEL: CPT

## 2024-06-06 PROCEDURE — 82550 ASSAY OF CK (CPK): CPT

## 2024-06-06 PROCEDURE — 84460 ALANINE AMINO (ALT) (SGPT): CPT

## 2024-06-10 ENCOUNTER — TELEPHONE (OUTPATIENT)
Dept: INTERNAL MEDICINE | Facility: CLINIC | Age: 79
End: 2024-06-10
Payer: MEDICARE

## 2024-06-20 NOTE — TELEPHONE ENCOUNTER
6/20/24, DOROTEO Update-DOROTEO met with family along with patient in room.  Discussed patient needing rehab.  Patient in agreement with rehab.  Informed patient and family insurance not taken at [facility] and [facility] have no beds.  Wife gave the 4th choice as [facility] and 5th choice as [facility].  Call placed to Lynn on referral for both facilities.  No beds available for [facility] and information would be sent over to the billing office for [facility] to check with this worker in am.  DOROTEO to follow.      Chela George ANN MARIE  [facility] Case Management  [phone]     Patient already seen in clinic 9/28/2020 with Liam Rowell.

## 2024-07-09 ENCOUNTER — NURSE TRIAGE (OUTPATIENT)
Dept: INTERNAL MEDICINE | Facility: CLINIC | Age: 79
End: 2024-07-09
Payer: MEDICARE

## 2024-07-09 DIAGNOSIS — U07.1 INFECTION DUE TO 2019 NOVEL CORONAVIRUS: Primary | ICD-10-CM

## 2024-07-09 NOTE — TELEPHONE ENCOUNTER
General Call    Contacts       Contact Date/Time Type Contact Phone/Fax    07/09/2024 07:53 AM CDT Phone (Incoming) Niranjan Dodge (Self) [phone] (H)          Reason for Call:  Requesting call back/symptoms    What are your questions or concerns:  Would like next steps on positive covid test 7/8. Refused appt and refused to speak to RN. Requested to speak to Tati assistant. Pt was informed that a message would be sent to his care team     Could we send this information to you in [employer] or would you prefer to receive a phone call?:   Patient would prefer a phone call   Okay to leave a detailed message?: Yes at Cell number on file:    Telephone Information:   Mobile [phone]

## 2024-07-09 NOTE — TELEPHONE ENCOUNTER
Patient calls in to report he had a positive Covid test last night 7/8/24 and has had sore throat, cold symptoms, congestion and mild shortness of breath. See full assessment below.     Nurse Triage disposition is to Discuss with PCP and callback by nurse within 1 hour.    Patient would like a prescription for Paxlovid and is open to a virtual visit if recommended.     1. COVID-19 DIAGNOSIS:  Home test 7/8    2. COVID-19 EXPOSURE:  no    3. ONSET:  7/7/24    4. WORST SYMPTOM: cough        5. COUGH:  dry    6. FEVER: no    7. RESPIRATORY STATUS: wheezing, mild shortness of breath    8. BETTER-SAME-WORSE: same    9. OTHER SYMPTOMS: fatigue, headache, muscle pain, sore throat, cold symptoms and congestion        10. HIGH RISK DISEASE: no          11. VACCINE: yes     12. O2 SATURATION MONITOR: no    RN COVID TREATMENT VISIT  07/09/24      Niranjan Dodge  79 year old  Current weight? 188lbs    Has the patient been seen by a primary care provider at an [facility] or [facility] within the past two years? Yes.   Have you been in close proximity to/do you have a known exposure to a person with a confirmed case of influenza? No.     General treatment eligibility:  Date of positive COVID test (PCR or at home)?  7/8/24    Are you or have you been hospitalized for this COVID-19 infection? No.   Have you received monoclonal antibodies or antiviral treatment for COVID-19 since this positive test? No.   Do you have any of the following conditions that place you at risk of being very sick from COVID-19?   - Age 50 years or older  Yes, patient has at least one high risk condition as noted above.     Current COVID symptoms:   - cough  - shortness of breath or difficulty breathing  - fatigue  - headache  - sore throat  - congestion or runny nose  Yes. Patient has at least one symptom as selected.     How many days since symptoms started? 5 days or less. Established patient, 12 years or older weighing  at least 88.2 lbs, who has symptoms that started in the past 5 days, has not been hospitalized nor received treatment already, and is at risk for being very sick from COVID-19.     Treatment eligibility by RN:  Are you currently pregnant or nursing? No  Do you have a clinically significant hypersensitivity to nirmatrelvir or ritonavir, or toxic epidermal necrolysis (TEN) or Christianson-Jorge Syndrome? No  Do you have a history of hepatitis, any hepatic impairment on the Problem List (such as Child-Madrigal Class C, cirrhosis, fatty liver disease, alcoholic liver disease), or was the last liver lab (hepatic panel, ALT, AST, ALK Phos, bilirubin) elevated in the past 6 months? No  Do you have any history of severe renal impairment (eGFR < 30mL/min)? No    Is patient eligible to continue? Yes, patient meets all eligibility requirements for the RN COVID treatment (as denoted by all no responses above).     Current Outpatient Medications   Medication Sig Dispense Refill    rosuvastatin (CRESTOR) 5 MG tablet Take 1 tablet (5 mg) by mouth daily 90 tablet 2    tadalafil (CIALIS) 10 MG tablet Take 1 tablet (10 mg) by mouth daily as needed (Erectile dysfunction) (Patient not taking: Reported on 5/20/2024) 10 tablet 4       Medications from List 1 of the standing order (on medications that exclude the use of Paxlovid) that patient is taking: NONE. Is patient taking [place]'s Wort? No  Is patient taking Mia's Wort or any meds from List 1? No.   Medications from List 2 of the standing order (on meds that provider needs to adjust) that patient is taking: NONE. Is patient on any of the meds from List 2? No.   Medications from List 3 of standing order (on meds that a RN needs to adjust) that patient is taking: rosuvastatin (Crestor): Instructed patient to stop rosuvastatin while taking Paxlovid and restart rosuvastatin 1 day after the completion of Paxlovid.  Is patient on any meds from List 3? Yes. Patient is on at least one med  that needs a provider visit and will be scheduled or transferred to a  at the end of this call.               Reason for Disposition   MILD difficulty breathing (e.g., minimal/no SOB at rest, SOB with walking, pulse <100)    Additional Information   Negative: SEVERE difficulty breathing (e.g., struggling for each breath, speaks in single words)   Negative: Shock suspected (e.g., cold/pale/clammy skin, too weak to stand, low BP, rapid pulse)   Negative: Sounds like a life-threatening emergency to the triager   Negative: Difficult to awaken or acting confused (e.g., disoriented, slurred speech)   Negative: Bluish (or gray) lips or face now   Negative: Diagnosed or suspected COVID-19 and symptoms lasting 3 or more weeks   Negative: COVID-19 exposure and no symptoms   Negative: COVID-19 vaccine reaction suspected (e.g., fever, headache, muscle aches) occurring 1 to 3 days after getting vaccine   Negative: COVID-19 vaccine, questions about   Negative: Lives with someone known to have influenza (flu test positive) and flu-like symptoms (e.g., cough, runny nose, sore throat, SOB; with or without fever)   Negative: Possible COVID-19 symptoms and triager concerned about severity of symptoms or other causes   Negative: COVID-19 and breastfeeding, questions about   Negative: SEVERE or constant chest pain or pressure  (Exception: Mild central chest pain, present only when coughing.)   Negative: MODERATE difficulty breathing (e.g., speaks in phrases, SOB even at rest, pulse 100-120)   Negative: Headache and stiff neck (can't touch chin to chest)   Negative: Oxygen level (e.g., pulse oximetry) 90% or lower   Negative: Chest pain or pressure  (Exception: MILD central chest pain, present only when coughing.)   Negative: Drinking very little and dehydration suspected (e.g., no urine > 12 hours, very dry mouth, very lightheaded)   Negative: Patient sounds very sick or weak to the triager    Protocols used: Coronavirus  (COVID-19) Diagnosed or [id number]

## 2024-07-09 NOTE — TELEPHONE ENCOUNTER
Outgoing call to patient. Relayed PCP's detailed message.     Patient will start Paxlovid today. Instructed to call back or be seen in ED if symptoms worsen or new symptoms arise.     Patient is thankful and agreeable with plan. No further questions at this time.

## 2024-07-09 NOTE — TELEPHONE ENCOUNTER
Tell patient I prescribed Paxlovid and he can have someone go to the pharmacy and pick that up for him to start today.  Make sure he does not take the rosuvastatin for the 5 days he is on the medication.  If he does have chest pain or severe shortness of breath he should go to the emergency room for immediate evaluation.

## 2024-07-11 NOTE — TELEPHONE ENCOUNTER
"Patient calling in to ask about isolation precautions for Covid-19. Referred to CDC guidelines that recommend \"If you test positive for COVID-19, stay home for at least 5 days and isolate from others in your home\". No further questions at this time  "

## 2024-08-15 ENCOUNTER — OFFICE VISIT (OUTPATIENT)
Dept: INTERNAL MEDICINE | Facility: CLINIC | Age: 79
End: 2024-08-15
Payer: MEDICARE

## 2024-08-15 VITALS
HEART RATE: 60 BPM | OXYGEN SATURATION: 97 % | BODY MASS INDEX: 28.88 KG/M2 | WEIGHT: 195 LBS | HEIGHT: 69 IN | SYSTOLIC BLOOD PRESSURE: 150 MMHG | DIASTOLIC BLOOD PRESSURE: 90 MMHG | TEMPERATURE: 98 F

## 2024-08-15 DIAGNOSIS — Z86.0100 HISTORY OF COLONIC POLYPS: ICD-10-CM

## 2024-08-15 DIAGNOSIS — M48.02 CERVICAL STENOSIS OF SPINAL CANAL: Primary | ICD-10-CM

## 2024-08-15 DIAGNOSIS — R03.0 ELEVATED BLOOD PRESSURE READING WITHOUT DIAGNOSIS OF HYPERTENSION: ICD-10-CM

## 2024-08-15 DIAGNOSIS — N52.9 ERECTILE DYSFUNCTION, UNSPECIFIED ERECTILE DYSFUNCTION TYPE: ICD-10-CM

## 2024-08-15 DIAGNOSIS — G47.33 OSA (OBSTRUCTIVE SLEEP APNEA): ICD-10-CM

## 2024-08-15 DIAGNOSIS — E78.00 HYPERCHOLESTEROLEMIA: ICD-10-CM

## 2024-08-15 DIAGNOSIS — G25.0 ESSENTIAL TREMOR: ICD-10-CM

## 2024-08-15 PROCEDURE — 99214 OFFICE O/P EST MOD 30 MIN: CPT | Performed by: INTERNAL MEDICINE

## 2024-08-15 RX ORDER — TADALAFIL 10 MG/1
10 TABLET ORAL DAILY PRN
Qty: 20 TABLET | Refills: 5 | Status: SHIPPED | OUTPATIENT
Start: 2024-08-15

## 2024-08-15 RX ORDER — SENNOSIDES 8.6 MG
650 CAPSULE ORAL EVERY 8 HOURS PRN
COMMUNITY

## 2024-08-15 RX ORDER — PROPRANOLOL HCL 60 MG
60 CAPSULE, EXTENDED RELEASE 24HR ORAL
COMMUNITY
Start: 2024-05-22

## 2024-08-15 RX ORDER — COVID-19 ANTIGEN TEST
220 KIT MISCELLANEOUS 2 TIMES DAILY WITH MEALS
COMMUNITY

## 2024-08-15 NOTE — PROGRESS NOTES
Niranjan Dodge   79 year old male    Date of Visit: 8/15/2024    Chief Complaint   Patient presents with    Neck Pain     Neck pain for over 2 years     Subjective  79-year-old male with history of moderate to severe obstructive sleep apnea with an inspire placed.  Patient complains of worsening neck pain since he had the inspire and.    Imaging of the cervical spine with MRI January 2024 shows moderate central canal stenosis C3-4 with moderate 4-5 with degenerative arthritic changes.    Patient does not have radicular symptoms.    Patient has had neck pain exacerbations, severe in the past.  Most recently right C3-4 epidural injection in May of this year helped.    Patient saw neurology in May and was given Lyrica and mirtazapine, but patient did not take those medications as he was worried about the side effects.  He feels he sleeps adequately at night.    His inspire is still active.  He denies severe daytime sleepiness.    He is on rosuvastatin for hypercholesterolemia, but normal CK and liver tests in June of this year.  No generalized diffuse myalgias.    Patient did have COVID on July night treated with Paxlovid, but fully recovered.  No cough or fever.    Patient has been taking Aleve once or twice a day.  His blood pressure has been normal in the past although in May was borderline high.  Kidney function labs were normal October of last year.    No lower extremity edema.  No history of stomach ulcer abdominal pain.  No history of GI bleeding.    .  He did see physical therapy patient has been doing range of motion exercises more recently.,  But did not feel it helped as much as he would like.  He is looking for somebody to do more massage and do the physical therapy for him.    He does spend the winter in [place].  He does have a soon for down there.  He does golf quite a bit.  He does state that after he boluses neck loosens up and feels somewhat better.    He has also been taking a turmeric  "supplement recently.  Has been using some topical ointments variable benefit.    PMHx:    Past Medical History:   Diagnosis Date    BCC (basal cell carcinoma), face     Colonic fistula     Gastroesophageal reflux disease without esophagitis     Hypercholesterolemia     Other male erectile dysfunction     Schwannoma - Base of throat/tongue     Sleep apnea      PSHx:    Past Surgical History:   Procedure Laterality Date    Colon Surgery due to Fistula      Left Knee Arthroscopic Surgery Left     Schwannoma Removal      Inferior aspect of the tongue    SKIN CANCER EXCISION Left     To remove basal cell CA from left lower eyelid     Immunizations:   Immunization History   Administered Date(s) Administered    COVID-19 12+ (2023-24) (MODERNA) 10/18/2023    COVID-19 Bivalent 18+ (Moderna) 09/26/2022    COVID-19 MONOVALENT 12+ (Pfizer) 10/04/2021    COVID-19 Monovalent 18+ (Moderna) 04/18/2022    Flu, Unspecified 09/09/2020    Influenza (High Dose) 3 valent vaccine 10/05/2013, 09/14/2017, 09/11/2018, 08/30/2019, 09/19/2022    Influenza (IIV3) PF 09/20/2012, 10/22/2014    Influenza Vaccine 65+ (FLUAD) 09/26/2023    Influenza Vaccine 65+ (Fluzone HD) 09/09/2021, 09/18/2022, 09/26/2023    Influenza Vaccine >6 months,quad, PF 09/20/2012    Influenza Vaccine, 6+MO IM (QUADRIVALENT W/PRESERVATIVES) 09/20/2012    Influenza, seasonal, injectable, PF 09/23/2016    Pneumo Conj 13-V (2010&after) 06/08/2015    Pneumococcal 23 valent 06/02/2014    RSV Vaccine (Arexvy) 10/04/2023    TDAP (Adacel,Boostrix) 06/30/2016    Zoster recombinant adjuvanted (SHINGRIX) 01/02/2020, 05/26/2020    Zoster vaccine, live 05/07/2012       ROS A comprehensive review of systems was performed and was otherwise negative    Medications, allergies, and problem list were reviewed and updated    Exam  BP (!) 150/90   Pulse 60   Temp 98  F (36.7  C)   Ht 1.75 m (5' 8.9\")   Wt 88.5 kg (195 lb)   SpO2 97%   BMI 28.88 kg/m    Mild reduction in range of motion " of neck.    Assessment/Plan  1. Cervical stenosis of spinal canal  Chronic degenerative change of neck with moderate central canal stenosis but no radicular symptoms.    I stressed the importance of treating the neck well on a daily basis.  He needs to do more daily range of motion exercises in a slow and gentle fashion close not over strain the neck.  Avoid strain type activities such as golf or lifting.    Good nutrition and avoid dehydration to avoid muscle spasm of the neck.    I encouraged him to do some gentle stretching in the swimming pool this winter in [place].    He was thinking about getting a massage therapist for weekly neck massage which would be likely additional.    He could do further physical therapy.    We discussed pain medication, but I reminded him that the medication just covers up the pain and does not deal with lying problem.  If he covers up the pain, and does activities that hurt his neck, he could worsen his arthritis in the long run.    He can use Aleve 1-2 tablets a day but I did warn him on risk of affecting blood pressure, kidney function ulcer risk and bleeding risk.    We discussed using Tylenol 2 tablets twice a day.    I did discuss the potential liver toxicity risk of high-dose nutritional supplements such as turmeric.    He can use topical creams, such as the CBD cream he has or topical Voltaren or Bengay.    Undergo the epidural injections for further nerve blocks with the pain clinic in [place], but he was warned that there is some risk with these procedures and there is not a long-term benefit.  I would encourage him to work on the daily range of motion exercises and daily care of his neck as the main focus.     2. Elevated blood pressure reading without diagnosis of hypertension   Follow-up in October for physical exam.  Patient given warnings on NSAID use.    3. Essential tremor  Patient reports improvement with essential tremor with the propranolol LA 60 mg daily.  He  denies any lightheaded dizzy spells.  Reevaluate in October.    4. Erectile dysfunction, unspecified erectile dysfunction type  Patient requested refill, previously tolerated  - tadalafil (CIALIS) 10 MG tablet; Take 1 tablet (10 mg) by mouth daily as needed (Erectile dysfunction)  Dispense: 20 tablet; Refill: 5    5. History of colonic polyps  He has a colonoscopy scheduled for next month    6. Hypercholesterolemia  Continue rosuvastatin.  Normal CK in June.  Not describing diffuse myalgias    7. SHA (obstructive sleep apnea)  Patient has the inspire rate      Return in 8 weeks (on 10/11/2024) for Adult wellness visit physical exam.   Patient instructions:  Focus on daily care of your neck with gentle range of motion exercises on a regular basis.  Avoid letting her neck sit still too long such as watching TV or being on the computer.    Avoid strain type activity such as overaggressive golfing or lifting.    Avoid falling asleep in the chair.    Regular massage of the muscles can help reduce muscle pain and tightness.  You can use the topical creams or topical Voltaren or Bengay.    You can use Aleve to reduce pain, but it does just cover up the pain and does not reduce the degenerative changes in your spine from over strain.  Do not use meloxicam with Aleve.    Aleve can affect blood pressure, empty stomach ulcers and affect kidney function.    Check your blood pressure to make sure it is normal as it was mildly high in clinic.  Goal blood pressure less than 135/85.    Tumeric and other herbal supplements can have a liver toxicity risk especially at higher dose.    You can take the Tylenol 2 tablets twice a day for pain.    Lyrica is more for nerve pain, you do not need to use that.    Herberth Duffy MD, MD        Current Outpatient Medications   Medication Sig Dispense Refill    acetaminophen (TYLENOL 8 HOUR ARTHRITIS PAIN) 650 MG CR tablet Take 650 mg by mouth every 8 hours as needed for mild pain or fever       "naproxen sodium 220 MG capsule Take 220 mg by mouth 2 times daily (with meals)      propranolol ER (INDERAL LA) 60 MG 24 hr capsule Take 60 mg by mouth      rosuvastatin (CRESTOR) 5 MG tablet Take 1 tablet (5 mg) by mouth daily 90 tablet 2    tadalafil (CIALIS) 10 MG tablet Take 1 tablet (10 mg) by mouth daily as needed (Erectile dysfunction) 20 tablet 5     Allergies   Allergen Reactions    Strawberry Flavor Hives     Social History     Tobacco Use    Smoking status: Never     Passive exposure: Never    Smokeless tobacco: Never   Vaping Use    Vaping status: Never Used   Substance Use Topics    Alcohol use: Yes     Alcohol/week: 14.0 standard drinks of alcohol     Types: 14 Shots of liquor per week     Comment: Drinks \"2 scotch per day.\" His partner said he has 3 per day.    Drug use: Never             Subjective   Niranjan is a 79 year old, presenting for the following health issues:  Neck Pain (Neck pain for over 2 years)        8/15/2024     3:40 PM   Additional Questions   Roomed by Divya PRIETO   Accompanied by qasim     History of Present Illness       Reason for visit:  Neck pain    He eats 0-1 servings of fruits and vegetables daily.He consumes 0 sweetened beverage(s) daily.He exercises with enough effort to increase his heart rate 9 or less minutes per day.  He exercises with enough effort to increase his heart rate 3 or less days per week.   He is taking medications regularly.                     Objective    BP (!) 150/90   Pulse 60   Temp 98  F (36.7  C)   Ht 1.75 m (5' 8.9\")   Wt 88.5 kg (195 lb)   SpO2 97%   BMI 28.88 kg/m    Body mass index is 28.88 kg/m .  Physical Exam               Signed Electronically by: Herberth Duffy MD    "

## 2024-08-15 NOTE — PATIENT INSTRUCTIONS
Focus on daily care of your neck with gentle range of motion exercises on a regular basis.  Avoid letting her neck sit still too long such as watching TV or being on the computer.    Avoid strain type activity such as overaggressive golfing or lifting.    Avoid falling asleep in the chair.    Regular massage of the muscles can help reduce muscle pain and tightness.  You can use the topical creams or topical Voltaren or Bengay.    You can use Aleve to reduce pain, but it does just cover up the pain and does not reduce the degenerative changes in your spine from over strain.  Do not use meloxicam with Aleve.    Aleve can affect blood pressure, empty stomach ulcers and affect kidney function.    Check your blood pressure to make sure it is normal as it was mildly high in clinic.  Goal blood pressure less than 135/85.    Tumeric and other herbal supplements can have a liver toxicity risk especially at higher dose.    You can take the Tylenol 2 tablets twice a day for pain.    Lyrica is more for nerve pain, you do not need to use that.

## 2024-09-12 ENCOUNTER — OFFICE VISIT (OUTPATIENT)
Dept: INTERNAL MEDICINE | Facility: CLINIC | Age: 79
End: 2024-09-12
Payer: MEDICARE

## 2024-09-12 VITALS
DIASTOLIC BLOOD PRESSURE: 66 MMHG | SYSTOLIC BLOOD PRESSURE: 128 MMHG | HEIGHT: 69 IN | WEIGHT: 189 LBS | OXYGEN SATURATION: 97 % | TEMPERATURE: 97.8 F | HEART RATE: 72 BPM | RESPIRATION RATE: 16 BRPM | BODY MASS INDEX: 27.99 KG/M2

## 2024-09-12 DIAGNOSIS — R03.0 ELEVATED BLOOD PRESSURE READING WITHOUT DIAGNOSIS OF HYPERTENSION: Primary | ICD-10-CM

## 2024-09-12 DIAGNOSIS — M54.2 NECK PAIN: ICD-10-CM

## 2024-09-12 DIAGNOSIS — E78.00 HYPERCHOLESTEROLEMIA: ICD-10-CM

## 2024-09-12 DIAGNOSIS — Z86.0100 HISTORY OF COLONIC POLYPS: ICD-10-CM

## 2024-09-12 DIAGNOSIS — G47.33 OSA (OBSTRUCTIVE SLEEP APNEA): ICD-10-CM

## 2024-09-12 PROCEDURE — 99213 OFFICE O/P EST LOW 20 MIN: CPT | Performed by: INTERNAL MEDICINE

## 2024-09-12 NOTE — PATIENT INSTRUCTIONS
Get the new Omron blood pressure machine.    Bring that blood pressure machine with you to the physical.    Check your blood pressures at different times of the day.    Try not to take the Aleve, and instead take Tylenol.    Sleep apnea affects your blood pressure.  See if your blood pressure is better when you are back using the inspire.    If your blood pressure still averaging more than 135/85, I will discuss having you start amlodipine blood pressure medication    You can take the rosuvastatin medication at the most convenient time during the day.  Time of day that you take medication is not essential with rosuvastatin.

## 2024-09-12 NOTE — PROGRESS NOTES
Niranjan Dodge   79 year old male    Date of Visit: 9/12/2024    Chief Complaint   Patient presents with    Hypertension     Subjective  Past history of severe obstructive sleep apnea, has the inspire implant.  But he thinks the inspire was worsening his cervical DJD.  MRI January 2024 showed moderate stenosis at C3-4.  He has been using Lyrica and mirtazapine.  He is uses Aleve in the morning and Tylenol at night.    In August his blood pressure was 150/90 at his exam, but he was 6 pounds heavier.    He had been recovering from COVID in July.    He is going back to his inspire now, he has been using his old CPAP machine over the past couple of weeks.    His blood pressure at home has been running in the 138/70-1 51/84 range, most numbers in the 140s/80s.  He has not been on a blood pressure medicine.    He is on Inderal LA 60 mg a day for tremor.    He has been taking the rosuvastatin in the evening.    He had normal kidney labs October 2023.    He has a brother with coronary disease and his mother had a stroke.    No headache complaints.  No worsening daytime sleepiness.  No palpitations.  No increasing shortness of breath or edema    PMHx:    Past Medical History:   Diagnosis Date    BCC (basal cell carcinoma), face     Colonic fistula     Gastroesophageal reflux disease without esophagitis     Hypercholesterolemia     Other male erectile dysfunction     Schwannoma - Base of throat/tongue     Sleep apnea      PSHx:    Past Surgical History:   Procedure Laterality Date    Colon Surgery due to Fistula      Left Knee Arthroscopic Surgery Left     Schwannoma Removal      Inferior aspect of the tongue    SKIN CANCER EXCISION Left     To remove basal cell CA from left lower eyelid     Immunizations:   Immunization History   Administered Date(s) Administered    COVID-19 12+ (MODERNA) 10/18/2023    COVID-19 Bivalent 18+ (Moderna) 09/26/2022    COVID-19 MONOVALENT 12+ (Pfizer) 10/04/2021    COVID-19 Monovalent  "18+ (Moderna) 04/18/2022    Flu, Unspecified 09/09/2020    Influenza (High Dose) Trivalent,PF (Fluzone) 10/05/2013, 09/14/2017, 09/11/2018, 08/30/2019, 09/19/2022    Influenza (IIV3) PF 09/20/2012, 10/22/2014    Influenza Vaccine 65+ (FLUAD) 09/26/2023    Influenza Vaccine 65+ (Fluzone HD) 09/09/2021, 09/18/2022, 09/26/2023    Influenza Vaccine >6 months,quad, PF 09/20/2012    Influenza Vaccine, 6+MO IM (QUADRIVALENT W/PRESERVATIVES) 09/20/2012    Influenza, seasonal, injectable, PF 09/23/2016    Pneumo Conj 13-V (2010&after) 06/08/2015    Pneumococcal 23 valent 06/02/2014    RSV Vaccine (Arexvy) 10/04/2023    TDAP (Adacel,Boostrix) 06/30/2016    Zoster recombinant adjuvanted (SHINGRIX) 01/02/2020, 05/26/2020    Zoster vaccine, live 05/07/2012       ROS A comprehensive review of systems was performed and was otherwise negative    Medications, allergies, and problem list were reviewed and updated    Exam  /66 (BP Location: Right arm, Patient Position: Sitting, Cuff Size: Adult Regular)   Pulse 72   Temp 97.8  F (36.6  C)   Resp 16   Ht 1.75 m (5' 8.9\")   Wt 85.7 kg (189 lb)   SpO2 97%   BMI 27.99 kg/m    Blood pressure checked by me was 130/80.  His blood pressure machine showed 144/83.  No edema.  Heart regular.    Assessment/Plan  1. Elevated blood pressure reading without diagnosis of hypertension  Unclear status of his blood pressure.  He has had some intermittent elevated blood pressures, but normal on my check today here.    I did discuss factors affecting blood pressure including the his sleep apnea, and he has been off his inspire recently which may have affected his blood pressure.    He also continues on the Aleve every morning.    He has been recovering from weight gain and some deconditioning earlier this summer with COVID.    His current blood pressure cuff may not be accurate, as it was approximately 10 points higher on systolic today.    I encouraged him to get a new Omron machine, and he " will follow-up next month at his physical to evaluate his blood pressure.    Consider amlodipine if needed.    See patient instructions concerning the Aleve and CPAP use.    2. SHA (obstructive sleep apnea)  He will go back to using inspire.  He is concerned that the inspire may exacerbate his neck pain at night.  He can use his CPAP machine but unclear if that is as adequate to control his sleep apnea    3. Neck pain  Moderately severe DJD.  I encouraged him to try to avoid Aleve.  Use Tylenol twice a day instead.  Continue range of motion exercises.  Uses Lyrica.    4. Hypercholesterolemia  He can take his rosuvastatin earlier in the evening if that helps his evening schedule    5. History of colonic polyps  He is scheduled for colonoscopy this fall.      Return in 29 days (on 10/11/2024) for Adult wellness visit physical exam and blood pressure follow-up appointment.   Patient Instructions   Get the new Omron blood pressure machine.    Bring that blood pressure machine with you to the physical.    Check your blood pressures at different times of the day.    Try not to take the Aleve, and instead take Tylenol.    Sleep apnea affects your blood pressure.  See if your blood pressure is better when you are back using the inspire.    If your blood pressure still averaging more than 135/85, I will discuss having you start amlodipine blood pressure medication    You can take the rosuvastatin medication at the most convenient time during the day.  Time of day that you take medication is not essential with rosuvastatin.    Herberth Duffy MD, MD        Current Outpatient Medications   Medication Sig Dispense Refill    acetaminophen (TYLENOL 8 HOUR ARTHRITIS PAIN) 650 MG CR tablet Take 650 mg by mouth every 8 hours as needed for mild pain or fever      naproxen sodium 220 MG capsule Take 220 mg by mouth 2 times daily (with meals)      propranolol ER (INDERAL LA) 60 MG 24 hr capsule Take 60 mg by mouth      rosuvastatin  "(CRESTOR) 5 MG tablet Take 1 tablet (5 mg) by mouth daily 90 tablet 2    tadalafil (CIALIS) 10 MG tablet Take 1 tablet (10 mg) by mouth daily as needed (Erectile dysfunction) 20 tablet 5     Allergies   Allergen Reactions    Strawberry Flavor Hives     Social History     Tobacco Use    Smoking status: Never     Passive exposure: Never    Smokeless tobacco: Never   Vaping Use    Vaping status: Never Used   Substance Use Topics    Alcohol use: Yes     Alcohol/week: 14.0 standard drinks of alcohol     Types: 14 Shots of liquor per week     Comment: Drinks \"2 scotch per day.\" His partner said he has 3 per day.    Drug use: Never             Subjective   Niranjan is a 79 year old, presenting for the following health issues:  Hypertension      9/12/2024    10:39 AM   Additional Questions   Roomed by Ivana     History of Present Illness       Reason for visit:  HTN   He is taking medications regularly.                     Objective    /66 (BP Location: Right arm, Patient Position: Sitting, Cuff Size: Adult Regular)   Pulse 72   Temp 97.8  F (36.6  C)   Resp 16   Ht 1.75 m (5' 8.9\")   Wt 85.7 kg (189 lb)   SpO2 97%   BMI 27.99 kg/m    Body mass index is 27.99 kg/m .  Physical Exam               Signed Electronically by: Herberth Duffy MD    "

## 2024-09-24 ENCOUNTER — TRANSFERRED RECORDS (OUTPATIENT)
Dept: HEALTH INFORMATION MANAGEMENT | Facility: CLINIC | Age: 79
End: 2024-09-24
Payer: MEDICARE

## 2024-10-11 ENCOUNTER — OFFICE VISIT (OUTPATIENT)
Dept: INTERNAL MEDICINE | Facility: CLINIC | Age: 79
End: 2024-10-11
Payer: MEDICARE

## 2024-10-11 VITALS
OXYGEN SATURATION: 98 % | TEMPERATURE: 97.6 F | SYSTOLIC BLOOD PRESSURE: 134 MMHG | HEART RATE: 73 BPM | WEIGHT: 179.4 LBS | DIASTOLIC BLOOD PRESSURE: 84 MMHG | BODY MASS INDEX: 26.57 KG/M2 | HEIGHT: 69 IN | RESPIRATION RATE: 18 BRPM

## 2024-10-11 DIAGNOSIS — Z23 NEED FOR COVID-19 VACCINE: ICD-10-CM

## 2024-10-11 DIAGNOSIS — E78.00 HYPERCHOLESTEROLEMIA: ICD-10-CM

## 2024-10-11 DIAGNOSIS — G47.33 OSA (OBSTRUCTIVE SLEEP APNEA): ICD-10-CM

## 2024-10-11 DIAGNOSIS — Z00.00 ENCOUNTER FOR WELLNESS EXAMINATION IN ADULT: Primary | ICD-10-CM

## 2024-10-11 DIAGNOSIS — Z85.828 HISTORY OF SKIN CANCER: ICD-10-CM

## 2024-10-11 DIAGNOSIS — Z51.81 ENCOUNTER FOR THERAPEUTIC DRUG MONITORING: ICD-10-CM

## 2024-10-11 DIAGNOSIS — I10 BENIGN ESSENTIAL HYPERTENSION: ICD-10-CM

## 2024-10-11 DIAGNOSIS — G25.0 ESSENTIAL TREMOR: ICD-10-CM

## 2024-10-11 DIAGNOSIS — Z12.5 SCREENING FOR PROSTATE CANCER: ICD-10-CM

## 2024-10-11 DIAGNOSIS — Z86.0100 HISTORY OF COLONIC POLYPS: ICD-10-CM

## 2024-10-11 LAB
ALBUMIN SERPL BCG-MCNC: 4.6 G/DL (ref 3.5–5.2)
ALP SERPL-CCNC: 79 U/L (ref 40–150)
ALT SERPL W P-5'-P-CCNC: 22 U/L (ref 0–70)
ANION GAP SERPL CALCULATED.3IONS-SCNC: 11 MMOL/L (ref 7–15)
AST SERPL W P-5'-P-CCNC: 33 U/L (ref 0–45)
BILIRUB SERPL-MCNC: 1.3 MG/DL
BUN SERPL-MCNC: 9.4 MG/DL (ref 8–23)
CALCIUM SERPL-MCNC: 9.7 MG/DL (ref 8.8–10.4)
CHLORIDE SERPL-SCNC: 104 MMOL/L (ref 98–107)
CHOLEST SERPL-MCNC: 182 MG/DL
CREAT SERPL-MCNC: 0.7 MG/DL (ref 0.67–1.17)
EGFRCR SERPLBLD CKD-EPI 2021: >90 ML/MIN/1.73M2
ERYTHROCYTE [DISTWIDTH] IN BLOOD BY AUTOMATED COUNT: 12.3 % (ref 10–15)
FASTING STATUS PATIENT QL REPORTED: YES
FASTING STATUS PATIENT QL REPORTED: YES
GLUCOSE SERPL-MCNC: 88 MG/DL (ref 70–99)
HCO3 SERPL-SCNC: 24 MMOL/L (ref 22–29)
HCT VFR BLD AUTO: 43.7 % (ref 40–53)
HDLC SERPL-MCNC: 81 MG/DL
HGB BLD-MCNC: 14.7 G/DL (ref 13.3–17.7)
LDLC SERPL CALC-MCNC: 85 MG/DL
MCH RBC QN AUTO: 31.7 PG (ref 26.5–33)
MCHC RBC AUTO-ENTMCNC: 33.6 G/DL (ref 31.5–36.5)
MCV RBC AUTO: 94 FL (ref 78–100)
NONHDLC SERPL-MCNC: 101 MG/DL
PLATELET # BLD AUTO: 156 10E3/UL (ref 150–450)
POTASSIUM SERPL-SCNC: 4.1 MMOL/L (ref 3.4–5.3)
PROT SERPL-MCNC: 7.7 G/DL (ref 6.4–8.3)
PSA SERPL DL<=0.01 NG/ML-MCNC: 0.38 NG/ML (ref 0–6.5)
RBC # BLD AUTO: 4.63 10E6/UL (ref 4.4–5.9)
SODIUM SERPL-SCNC: 139 MMOL/L (ref 135–145)
TRIGL SERPL-MCNC: 78 MG/DL
WBC # BLD AUTO: 4.6 10E3/UL (ref 4–11)

## 2024-10-11 PROCEDURE — G0103 PSA SCREENING: HCPCS | Performed by: INTERNAL MEDICINE

## 2024-10-11 PROCEDURE — G0439 PPPS, SUBSEQ VISIT: HCPCS | Performed by: INTERNAL MEDICINE

## 2024-10-11 PROCEDURE — 85027 COMPLETE CBC AUTOMATED: CPT | Performed by: INTERNAL MEDICINE

## 2024-10-11 PROCEDURE — 99214 OFFICE O/P EST MOD 30 MIN: CPT | Mod: 25 | Performed by: INTERNAL MEDICINE

## 2024-10-11 PROCEDURE — 80061 LIPID PANEL: CPT | Performed by: INTERNAL MEDICINE

## 2024-10-11 PROCEDURE — 36415 COLL VENOUS BLD VENIPUNCTURE: CPT | Performed by: INTERNAL MEDICINE

## 2024-10-11 PROCEDURE — 80053 COMPREHEN METABOLIC PANEL: CPT | Performed by: INTERNAL MEDICINE

## 2024-10-11 RX ORDER — AMLODIPINE BESYLATE 2.5 MG/1
2.5 TABLET ORAL DAILY
Qty: 90 TABLET | Refills: 3 | Status: SHIPPED | OUTPATIENT
Start: 2024-10-11

## 2024-10-11 SDOH — HEALTH STABILITY: PHYSICAL HEALTH: ON AVERAGE, HOW MANY DAYS PER WEEK DO YOU ENGAGE IN MODERATE TO STRENUOUS EXERCISE (LIKE A BRISK WALK)?: 3 DAYS

## 2024-10-11 SDOH — HEALTH STABILITY: PHYSICAL HEALTH: ON AVERAGE, HOW MANY MINUTES DO YOU ENGAGE IN EXERCISE AT THIS LEVEL?: 60 MIN

## 2024-10-11 ASSESSMENT — SOCIAL DETERMINANTS OF HEALTH (SDOH): HOW OFTEN DO YOU GET TOGETHER WITH FRIENDS OR RELATIVES?: THREE TIMES A WEEK

## 2024-10-11 NOTE — PATIENT INSTRUCTIONS
Continue to monitor blood pressure closely.  If your blood pressure is averaging above 135/85 more than occasionally, proceed with starting amlodipine 2.5 mg a day.    Goal blood pressure less than 135/85 but not less than 110/60.    Continues to be active with regular walking.  Regular exercise helps blood pressure.    Continue to treat your sleep apnea.  Sleep apnea can increase your blood pressure.    Avoid Aleve or ibuprofen or Advil.  These medications can affect your blood pressure as well as affecting kidney function.  You can use them occasionally for your arthritis pain, however.    Continue your daily neck stretching exercises and massage.  Okay for topical treatment for your neck pain.  Avoid taking any pain medication before activities that may lead to further injury.    You could consider another colonoscopy September 2029, depending on your condition at that time.    Get your COVID-vaccine this November, as planned.    You do have a sclerotic lesion on your right calf.  See dermatology in November as planned.    See me in 6 months for a nonfasting blood pressure checkup appointment.

## 2024-10-11 NOTE — PROGRESS NOTES
Preventive Care Visit  [facility]  Herberth Duffy MD, Internal Medicine  Oct 11, 2024          Niranjan Dodge   79 year old male    Date of Visit: 10/11/2024    Chief Complaint   Patient presents with    Annual Visit     Subjective  79-year-old male here for an adult wellness visit physical exam.  He santos in [place].    He has severe sleep apnea but had an inspire implant.  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    He is currently ramping it up for increasing use now.  He is using his CPAP machine when his inspire is not on.  He denies daytime sleepiness and feels it is adequately controlled at this time.    He has moderate cervical DJD seen on MRI in January of this year at C3-4.  He uses Aleve occasionally but not every day anymore.  No longer on Lyrica.  He does his massage and gentle range of motion exercises every day.  No arm radicular symptoms.    He takes propranolol for tremor, stable.  No longer drinking alcohol.    Normal brain MRI in 2022.    His mother had a stroke at age 80.  He has a brother with coronary disease.  Patient is tolerating rosuvastatin 5 mg daily.  Excellent cholesterol levels in June.  No generalized myalgia complaints or history of liver problems.  Normal liver test in June.    In 2018 he had a cardiac CT scan that showed a calcium score of 44.  He has not had an event.    He is never smoked.  No new cough or shortness of breath.    His blood pressure has been borderline high.  He has been checking it daily recently.  It has been in the 116/76-1 37/85 range.  But occasionally will get higher numbers in the 139/93 range.    He does have a new blood pressure cuff.    He does get morning headaches occasionally, but more associated with his neck pain.    Good exertional ability when he walks 3 miles every day.  No falls.    He did have COVID illness in July.  He is going to wait on his COVID shot now.    He just  had a colonoscopy last month that showed a 5 mm tubular adenoma.  No further colonoscopies were recommended because of age, but patient is thinking he may consider another colonoscopy at age 84.  He had a previous colon polyp in 2019 colonoscopy was negative.    1 time a night nocturia, urination frequency is stable.  PSA level was normal last year.    He does have a past history of skin cancer.  Basal cell cancer has been documented but patient states that he had a melanoma many years ago and still sees the dermatologist every 6 months and has an appointment coming up in November.  He has noticed a sclerotic area on his right calf recently that he is going to get examined.    No vision changes.    PMHx:    Past Medical History:   Diagnosis Date    BCC (basal cell carcinoma), face     Colonic fistula     Gastroesophageal reflux disease without esophagitis     Hypercholesterolemia     Other male erectile dysfunction     Schwannoma - Base of throat/tongue     Sleep apnea      PSHx:    Past Surgical History:   Procedure Laterality Date    Colon Surgery due to Fistula      Left Knee Arthroscopic Surgery Left     Schwannoma Removal      Inferior aspect of the tongue    SKIN CANCER EXCISION Left     To remove basal cell CA from left lower eyelid     Immunizations:   Immunization History   Administered Date(s) Administered    COVID-19 12+ (MODERNA) 10/18/2023    COVID-19 Bivalent 18+ (Moderna) 09/26/2022    COVID-19 MONOVALENT 12+ (Pfizer) 10/04/2021    COVID-19 Monovalent 18+ (Moderna) 04/18/2022    Flu, Unspecified 09/09/2020    Influenza (High Dose) Trivalent,PF (Fluzone) 10/05/2013, 09/14/2017, 09/11/2018, 08/30/2019, 09/19/2022, 10/07/2024    Influenza (IIV3) PF 09/20/2012, 10/22/2014    Influenza Vaccine 65+ (FLUAD) 09/26/2023    Influenza Vaccine 65+ (Fluzone HD) 09/09/2021, 09/18/2022, 09/26/2023    Influenza Vaccine >6 months,quad, PF 09/20/2012    Influenza Vaccine, 6+MO IM (QUADRIVALENT W/PRESERVATIVES)  "09/20/2012    Influenza, seasonal, injectable, PF 09/23/2016    Pneumo Conj 13-V (2010&after) 06/08/2015    Pneumococcal 23 valent 06/02/2014    RSV Vaccine (Arexvy) 10/04/2023    TDAP (Adacel,Boostrix) 06/30/2016    Zoster recombinant adjuvanted (SHINGRIX) 01/02/2020, 05/26/2020    Zoster vaccine, live 05/07/2012       ROS A comprehensive review of systems was performed and was otherwise negative    Medications, allergies, and problem list were reviewed and updated    Exam  /84 (BP Location: Right arm, Patient Position: Sitting, Cuff Size: Adult Regular)   Pulse 73   Temp 97.6  F (36.4  C) (Oral)   Resp 18   Ht 1.75 m (5' 8.9\")   Wt 81.4 kg (179 lb 6.4 oz)   SpO2 98%   BMI 26.57 kg/m    Alert and oriented x 3 with good mood and affect.  Clock face drawing and word recall normal.  Mobility exam normal.  Mood and affect is normal.  Pupils and irises equal and reactive.  Extraocular muscles intact.  No jaundice or conjunctivitis.  He has some moderate cerumen but not completely obstructing.  Pharynx shows status post uvuloplasty but no pharyngitis or oral lesions or leukoplakia.  Teeth in adequate condition.  No cervical or supraclavicular or axillary adenopathy.  No JVD and no carotid bruits.  No thyromegaly or nodularity to inspection and palpation.  Lungs are clear to auscultation with good respiratory excursion.  Heart is regular with no murmur rub or gallop.  There is no ankle edema and +1 pedal pulses and feet in good condition.  Abdomen is nonobese nontender no hepatosplenomegaly or pulsatile abdominal mass.  Skin exam with a scar in his left eye.  He does have a well-circumscribed well-differentiated sclerotic plaque on his right medial calf.  Rectal exam shows a smooth symmetric prostate without nodule.    Assessment/Plan  1. Encounter for wellness examination in adult  Main focus for health maintenance is maintaining regular activity which she is doing with regular walking although somewhat " limited with his neck arthritis.    His other main focus is stroke and heart attack prevention with family history.  Cholesterols been well-controlled with statin.  He has a borderline blood pressure and does wish to initiate treatment if that does help some.    He is full code.    He will wait and get the COVID shot later this fall otherwise up-to-date on immunizations.    2. SHA (obstructive sleep apnea)  Currently ramping up inspire.  Using CPAP when not using inspire.    3. Essential tremor  Stable.  Controlled with propranolol    4. Hypercholesterolemia  Goal LDL less than 100.  Continue current rosuvastatin 5 mg.  - Lipid Profile    5. Screening for prostate cancer  Yearly screening, stable urinary frequency  - Prostate Specific Antigen Screen    6. Encounter for therapeutic drug monitoring    - CBC with platelets  - Comprehensive metabolic panel    7. Need for COVID-19 vaccine  He will get later this fall    8. History of colonic polyps  No further colonoscopies recommended by gastroenterology but patient can decide in 5 years whether he wishes to proceed, depending on his medical condition at the time    9. Benign essential hypertension  Blood pressure has been borderline.  Goal blood pressure less than 135/85 which she is no majority of the time recently.  But if it is sneaking up above 135/85, he can initiate amlodipine.  He was warned about lightheaded dizzy type side effects.  See patient instructions  - amLODIPine (NORVASC) 2.5 MG tablet; Take 1 tablet (2.5 mg) by mouth daily. For high blood pressure above 135/85  Dispense: 90 tablet; Refill: 3    10. History of skin cancer  See dermatology in November.  He will have the Skin lesion addressed.    Neck DJD, moderate to severe, continue with range of motion exercises and massage.  Okay for topical treatment.  He was counseled to avoid Aleve, especially before activity such as golfing.      Return in about 6 months (around 4/11/2025) for Nonfasting blood  pressure checkup appointment.   Patient Instructions   Continue to monitor blood pressure closely.  If your blood pressure is averaging above 135/85 more than occasionally, proceed with starting amlodipine 2.5 mg a day.    Goal blood pressure less than 135/85 but not less than 110/60.    Continues to be active with regular walking.  Regular exercise helps blood pressure.    Continue to treat your sleep apnea.  Sleep apnea can increase your blood pressure.    Avoid Aleve or ibuprofen or Advil.  These medications can affect your blood pressure as well as affecting kidney function.  You can use them occasionally for your arthritis pain, however.    Continue your daily neck stretching exercises and massage.  Okay for topical treatment for your neck pain.  Avoid taking any pain medication before activities that may lead to further injury.    You could consider another colonoscopy September 2029, depending on your condition at that time.    Get your COVID-vaccine this November, as planned.    You do have a sclerotic lesion on your right calf.  See dermatology in November as planned.    See me in 6 months for a nonfasting blood pressure checkup appointment.        Herberth Duffy MD, MD        Current Outpatient Medications   Medication Sig Dispense Refill    acetaminophen (TYLENOL 8 HOUR ARTHRITIS PAIN) 650 MG CR tablet Take 650 mg by mouth every 8 hours as needed for mild pain or fever      amLODIPine (NORVASC) 2.5 MG tablet Take 1 tablet (2.5 mg) by mouth daily. For high blood pressure above 135/85 90 tablet 3    naproxen sodium 220 MG capsule Take 220 mg by mouth 2 times daily (with meals)      propranolol ER (INDERAL LA) 60 MG 24 hr capsule Take 60 mg by mouth      rosuvastatin (CRESTOR) 5 MG tablet Take 1 tablet (5 mg) by mouth daily 90 tablet 2    tadalafil (CIALIS) 10 MG tablet Take 1 tablet (10 mg) by mouth daily as needed (Erectile dysfunction) 20 tablet 5     Allergies   Allergen Reactions    Strawberry Flavor  "Hives     Social History     Tobacco Use    Smoking status: Never     Passive exposure: Never    Smokeless tobacco: Never   Vaping Use    Vaping status: Never Used   Substance Use Topics    Alcohol use: Yes     Alcohol/week: 14.0 standard drinks of alcohol     Types: 14 Shots of liquor per week     Comment: Drinks \"2 scotch per day.\" His partner said he has 3 per day.    Drug use: Never             Subjective   Niranjan is a 79 year old, presenting for the following:  Annual Visit        10/11/2024    11:04 AM   Additional Questions   Roomed by ESTEE Moya         Health Care Directive  Patient has a Health Care Directive on file  Advance care planning document is on file and is current.    HPI              10/11/2024   General Health   How would you rate your overall physical health? Good   Feel stress (tense, anxious, or unable to sleep) Not at all            10/11/2024   Nutrition   Diet: Regular (no restrictions)    Vegetarian/vegan       Multiple values from one day are sorted in reverse-chronological order         10/11/2024   Exercise   Days per week of moderate/strenous exercise 3 days   Average minutes spent exercising at this level 60 min            10/11/2024   Social Factors   Frequency of gathering with friends or relatives Three times a week   Worry food won't last until get money to buy more No   Food not last or not have enough money for food? No   Do you have housing? (Housing is defined as stable permanent housing and does not include staying ouside in a car, in a tent, in an abandoned building, in an overnight shelter, or couch-surfing.) Yes   Are you worried about losing your housing? No   Lack of transportation? No   Unable to get utilities (heat,electricity)? No            10/11/2024   Fall Risk   Fallen 2 or more times in the past year? No   Trouble with walking or balance? No             10/11/2024   Activities of Daily Living- Home Safety   Needs help with the following daily activites None of " "the above   Safety concerns in the home None of the above            10/11/2024   Dental   Dentist two times every year? Yes            10/11/2024   Hearing Screening   Hearing concerns? None of the above            10/11/2024   Driving Risk Screening   Patient/family members have concerns about driving (!) DECLINE            10/11/2024   General Alertness/Fatigue Screening   Have you been more tired than usual lately? No            10/11/2024   Urinary Incontinence Screening   Bothered by leaking urine in past 6 months No            10/11/2024   TB Screening   Were you born outside of the [place]? No            Today's PHQ-2 Score:       10/11/2024    11:08 AM   PHQ-2 ( 1999 Pfizer)   PHQ-2 Score Incomplete           10/11/2024   Substance Use   Alcohol more than 3/day or more than 7/wk No   Do you have a current opioid prescription? No   How severe/bad is pain from 1 to 10? 5/10   Do you use any other substances recreationally? No        Social History     Tobacco Use    Smoking status: Never     Passive exposure: Never    Smokeless tobacco: Never   Vaping Use    Vaping status: Never Used   Substance Use Topics    Alcohol use: Yes     Alcohol/week: 14.0 standard drinks of alcohol     Types: 14 Shots of liquor per week     Comment: Drinks \"2 scotch per day.\" His partner said he has 3 per day.    Drug use: Never       ASCVD Risk   The 10-year ASCVD risk score (Kya ESCOBEDO, et al., 2019) is: 27.5%    Values used to calculate the score:      Age: 79 years      Sex: Male      Is Non- : No      Diabetic: No      Tobacco smoker: No      Systolic Blood Pressure: 134 mmHg      Is BP treated: No      HDL Cholesterol: 83 mg/dL      Total Cholesterol: 187 mg/dL            Reviewed and updated as needed this visit by Provider                      Current providers sharing in care for this patient include:  Patient Care Team:  Herberth Duffy MD as PCP - General (Internal Medicine)  Herberth Duffy MD " "as Assigned PCP    The following health maintenance items are reviewed in Epic and correct as of today:  Health Maintenance   Topic Date Due    ANNUAL REVIEW OF HM ORDERS  Never done    HEPATITIS C SCREENING  Never done    COVID-19 Vaccine (5 - 2024-25 season) 09/01/2024    MEDICARE ANNUAL WELLNESS VISIT  10/09/2024    LIPID  06/06/2025    FALL RISK ASSESSMENT  10/11/2025    DTAP/TDAP/TD IMMUNIZATION (2 - Td or Tdap) 06/30/2026    GLUCOSE  10/09/2026    ADVANCE CARE PLANNING  10/13/2027    PHQ-2 (once per calendar year)  Completed    INFLUENZA VACCINE  Completed    Pneumococcal Vaccine: 65+ Years  Completed    ZOSTER IMMUNIZATION  Completed    RSV VACCINE  Completed    HPV IMMUNIZATION  Aged Out    MENINGITIS IMMUNIZATION  Aged Out    RSV MONOCLONAL ANTIBODY  Aged Out    COLORECTAL CANCER SCREENING  Discontinued            Objective    Exam  /84 (BP Location: Right arm, Patient Position: Sitting, Cuff Size: Adult Regular)   Pulse 73   Temp 97.6  F (36.4  C) (Oral)   Resp 18   Ht 1.75 m (5' 8.9\")   Wt 81.4 kg (179 lb 6.4 oz)   SpO2 98%   BMI 26.57 kg/m     Estimated body mass index is 26.57 kg/m  as calculated from the following:    Height as of this encounter: 1.75 m (5' 8.9\").    Weight as of this encounter: 81.4 kg (179 lb 6.4 oz).    Physical Exam          10/11/2024   Mini Cog   Clock Draw Score 2 Normal   3 Item Recall 3 objects recalled   Mini Cog Total Score 5                 Signed Electronically by: Herberth Duffy MD    "

## 2024-11-06 ENCOUNTER — TELEPHONE (OUTPATIENT)
Dept: INTERNAL MEDICINE | Facility: CLINIC | Age: 79
End: 2024-11-06
Payer: MEDICARE

## 2024-11-06 NOTE — TELEPHONE ENCOUNTER
Patient called to request Lab results.  RN reviewed provider note.  No further questions at this time.    Kidney and liver tests are normal.  Bilirubin level is okay.  Blood sugar/glucose is normal.  Excellent cholesterol levels.  Prostate cancer screening test is normal.  Hemoglobin and blood cell counts are normal.  Labs look good.  No change in treatment plan.

## 2024-12-07 DIAGNOSIS — E78.00 HYPERCHOLESTEROLEMIA: ICD-10-CM

## 2024-12-09 RX ORDER — ROSUVASTATIN CALCIUM 5 MG/1
5 TABLET, COATED ORAL DAILY
Qty: 90 TABLET | Refills: 2 | Status: SHIPPED | OUTPATIENT
Start: 2024-12-09

## 2025-01-28 ENCOUNTER — TELEPHONE (OUTPATIENT)
Dept: INTERNAL MEDICINE | Facility: CLINIC | Age: 80
End: 2025-01-28
Payer: MEDICARE

## 2025-01-28 NOTE — TELEPHONE ENCOUNTER
Called to patient, relayed provider's message in detail.   He is agreeing to making an telephone appointment.   However, he is in [place] right now. I did book him for tomorrow, but inform him that it might change since he is not in [place].     No further questions at this time.     John RITCHIE RN

## 2025-01-28 NOTE — TELEPHONE ENCOUNTER
Since patient is in [place], he would need to seek medical care in [place].  I do not have an [place] medical license.  If patient wishes to have cyclobenzaprine prescribed when he gets back to [place], I can talk to them then, however.

## 2025-01-28 NOTE — TELEPHONE ENCOUNTER
Called to patient, relayed provider's message in detail.     No further questions at this time.    Pay P. RN

## 2025-01-28 NOTE — TELEPHONE ENCOUNTER
Cyclobenzaprine can be used as needed for occasional muscle spasms.  But generally heat, massage and stretching works better in the long run for muscle spasms.  If patient wishes to discuss the use of this medication as a long-term medication with refills, then I would asked that he schedule at least a telephone visit with me to discuss.

## 2025-01-28 NOTE — TELEPHONE ENCOUNTER
New Medication Request      What medication are you requesting?: Cyclobenzaprine 5mg tablet      Patient was prescribed this short term by a physician in [place], he is wanting to know if it's okay to be prescribed long term by pcp.     Reason for medication request: chronic neck pain    Have you taken this medication before?: No    Controlled Substance Agreement on file:   CSA -- Patient Level:    CSA: None found at the patient level.         Patient offered an appointment? No    Preferred Pharmacy:    [facility]/pharmacy #[id number] - [place], [place] - [address] [facility]  [address]  [facility] [address]  Phone: [phone] Fax: [phone]

## 2025-02-11 ENCOUNTER — TELEPHONE (OUTPATIENT)
Dept: INTERNAL MEDICINE | Facility: CLINIC | Age: 80
End: 2025-02-11
Payer: MEDICARE

## 2025-02-11 NOTE — TELEPHONE ENCOUNTER
General Call    Contacts       Contact Date/Time Type Contact Phone/Fax    02/11/2025 09:03 AM CST Phone (Incoming) Niranjan Dodge (Self) [phone] (H)          Reason for Call:  stem frandy therapy    What are your questions or concerns:  pt went to a seminar about stem cell therapy and would like to look into getting it for his osteoarthritis pain-he would like to know what Dr Duffy thinks of this and if he could get a response as soon as possible?  He has an opportunity to get this and would like to do it sooner than later    Date of last appointment with provider: 10/11/24    Could we send this information to you in [employer] or would you prefer to receive a phone call?:   Patient would prefer a phone call   Okay to leave a detailed message?: Yes at Cell number on file:    Telephone Information:   Mobile [phone]

## 2025-02-11 NOTE — TELEPHONE ENCOUNTER
Outgoing call to patient and relayed provider message. Patient stated understanding and had no further questions.

## 2025-02-11 NOTE — TELEPHONE ENCOUNTER
S-(situation): Requesting PCP thoughts regarding stem cell therapy for Osteoarthritis.     B-(background): OV 10/11/25    Main focus for health maintenance is maintaining regular activity which she is doing with regular walking although somewhat limited with his neck arthritis.     He has severe sleep apnea but had an inspire implant. 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    A-(assessment): pt went to a seminar about stem cell therapy and would like to look into getting it for his osteoarthritis pain-he would like to know what Dr Duffy thinks of this and if he could get a response as soon as possible?  He has an opportunity to get this and would like to do it sooner than later    R-(recommendations): Routing to PCP for advise.     John RITCHIE RN

## 2025-02-11 NOTE — TELEPHONE ENCOUNTER
Stem cell therapy does not work for age-related osteoarthritis.  It is expensive and often is a scam.  I do not recommend it.

## 2025-02-22 ENCOUNTER — TRANSFERRED RECORDS (OUTPATIENT)
Dept: HEALTH INFORMATION MANAGEMENT | Facility: CLINIC | Age: 80
End: 2025-02-22
Payer: MEDICARE

## 2025-02-22 ENCOUNTER — MEDICAL CORRESPONDENCE (OUTPATIENT)
Dept: HEALTH INFORMATION MANAGEMENT | Facility: CLINIC | Age: 80
End: 2025-02-22

## 2025-03-14 ENCOUNTER — TRANSFERRED RECORDS (OUTPATIENT)
Dept: HEALTH INFORMATION MANAGEMENT | Facility: CLINIC | Age: 80
End: 2025-03-14

## 2025-04-16 ENCOUNTER — TRANSFERRED RECORDS (OUTPATIENT)
Dept: HEALTH INFORMATION MANAGEMENT | Facility: CLINIC | Age: 80
End: 2025-04-16
Payer: MEDICARE

## 2025-04-22 ENCOUNTER — TRANSFERRED RECORDS (OUTPATIENT)
Dept: HEALTH INFORMATION MANAGEMENT | Facility: CLINIC | Age: 80
End: 2025-04-22
Payer: MEDICARE

## 2025-04-30 ENCOUNTER — TRANSFERRED RECORDS (OUTPATIENT)
Dept: HEALTH INFORMATION MANAGEMENT | Facility: CLINIC | Age: 80
End: 2025-04-30
Payer: MEDICARE

## 2025-05-13 ENCOUNTER — TELEPHONE (OUTPATIENT)
Dept: INTERNAL MEDICINE | Facility: CLINIC | Age: 80
End: 2025-05-13
Payer: MEDICARE

## 2025-05-13 DIAGNOSIS — M54.2 NECK PAIN: Primary | ICD-10-CM

## 2025-05-13 DIAGNOSIS — M48.02 CERVICAL STENOSIS OF SPINAL CANAL: ICD-10-CM

## 2025-05-13 NOTE — TELEPHONE ENCOUNTER
Outgoing call to patient. Patient states he is not interested in PT and was not asking for it. He sees a massage therapist that is helpful and he gets trigger point injections for relief.

## 2025-05-13 NOTE — TELEPHONE ENCOUNTER
General Call    Contacts       Contact Date/Time Type Contact Phone/Fax    05/13/2025 03:23 PM CDT Phone (Incoming) Jesica calling from [facility] (Other) [phone]          Reason for Call:  Jesica calling from [facility], asking if Dr Duffy can put in a referral for patient for Physical Therapy, diagnosis: Neck Pain. Pt was getting Physical Therapy in [place].     Fax to: [phone]

## 2025-05-13 NOTE — TELEPHONE ENCOUNTER
Reason for Call:  Jesica calling from [facility], asking if Dr Duffy can put in a referral for patient for Physical Therapy, diagnosis: Neck Pain. Pt was getting Physical Therapy in [place].      Fax to: [phone]    RN noted a PT referral was placed on 5/20/24 expiring on 5/20/25.     Routing to PCP, if willing to place a few referral or if pt needs to be seen prior.     John RITCHIE RN

## 2025-05-21 ENCOUNTER — OFFICE VISIT (OUTPATIENT)
Dept: INTERNAL MEDICINE | Facility: CLINIC | Age: 80
End: 2025-05-21
Payer: MEDICARE

## 2025-05-21 VITALS
HEART RATE: 56 BPM | BODY MASS INDEX: 28.29 KG/M2 | DIASTOLIC BLOOD PRESSURE: 80 MMHG | HEIGHT: 69 IN | SYSTOLIC BLOOD PRESSURE: 132 MMHG | TEMPERATURE: 98 F | WEIGHT: 191 LBS | RESPIRATION RATE: 18 BRPM | OXYGEN SATURATION: 97 %

## 2025-05-21 DIAGNOSIS — I10 BENIGN ESSENTIAL HYPERTENSION: ICD-10-CM

## 2025-05-21 DIAGNOSIS — N40.1 BENIGN PROSTATIC HYPERPLASIA WITH URINARY FREQUENCY: ICD-10-CM

## 2025-05-21 DIAGNOSIS — Z86.0100 HISTORY OF COLONIC POLYPS: ICD-10-CM

## 2025-05-21 DIAGNOSIS — I10 ESSENTIAL HYPERTENSION: Primary | ICD-10-CM

## 2025-05-21 DIAGNOSIS — G25.0 ESSENTIAL TREMOR: ICD-10-CM

## 2025-05-21 DIAGNOSIS — R35.0 BENIGN PROSTATIC HYPERPLASIA WITH URINARY FREQUENCY: ICD-10-CM

## 2025-05-21 DIAGNOSIS — G47.33 OSA (OBSTRUCTIVE SLEEP APNEA): ICD-10-CM

## 2025-05-21 DIAGNOSIS — Z85.828 HISTORY OF SKIN CANCER: ICD-10-CM

## 2025-05-21 DIAGNOSIS — E78.00 HYPERCHOLESTEROLEMIA: ICD-10-CM

## 2025-05-21 DIAGNOSIS — Z23 NEED FOR VACCINATION AGAINST STREPTOCOCCUS PNEUMONIAE: ICD-10-CM

## 2025-05-21 PROCEDURE — 3075F SYST BP GE 130 - 139MM HG: CPT | Performed by: INTERNAL MEDICINE

## 2025-05-21 PROCEDURE — G0009 ADMIN PNEUMOCOCCAL VACCINE: HCPCS | Performed by: INTERNAL MEDICINE

## 2025-05-21 PROCEDURE — G2211 COMPLEX E/M VISIT ADD ON: HCPCS | Performed by: INTERNAL MEDICINE

## 2025-05-21 PROCEDURE — 90677 PCV20 VACCINE IM: CPT | Performed by: INTERNAL MEDICINE

## 2025-05-21 PROCEDURE — 99214 OFFICE O/P EST MOD 30 MIN: CPT | Performed by: INTERNAL MEDICINE

## 2025-05-21 PROCEDURE — 3079F DIAST BP 80-89 MM HG: CPT | Performed by: INTERNAL MEDICINE

## 2025-05-21 RX ORDER — TRIAMCINOLONE ACETONIDE 1 MG/G
CREAM TOPICAL
COMMUNITY

## 2025-05-21 RX ORDER — PROPRANOLOL HYDROCHLORIDE 10 MG/1
TABLET ORAL
COMMUNITY
Start: 2025-05-21

## 2025-05-21 RX ORDER — ROSUVASTATIN CALCIUM 5 MG/1
5 TABLET, COATED ORAL DAILY
Qty: 90 TABLET | Refills: 3 | Status: SHIPPED | OUTPATIENT
Start: 2025-05-21

## 2025-05-21 RX ORDER — AMLODIPINE BESYLATE 2.5 MG/1
2.5 TABLET ORAL DAILY
Qty: 90 TABLET | Refills: 3 | Status: SHIPPED | OUTPATIENT
Start: 2025-05-21

## 2025-05-21 RX ORDER — TAMSULOSIN HYDROCHLORIDE 0.4 MG/1
0.4 CAPSULE ORAL DAILY
COMMUNITY
Start: 2025-05-12

## 2025-05-21 NOTE — PROGRESS NOTES
Niranjan Dodge   80 year old male    Date of Visit: 5/21/2025    Chief Complaint   Patient presents with    Follow Up     BP check.     Subjective  80-year-old male here for blood pressure checkup.  Patient did go back on amlodipine 2.5 mg a day last winter.  He is no longer on the long-acting propranolol LA 60 mg a day just uses a 10 mg propranolol once a day to control the essential tremor, which is not severe.    Denies lightheaded dizzy spells.  Blood pressure is generally in the 150/80 range.  No edema.    Continues to walk 3 miles a day with good exertional ability and no chest pain or increasing shortness of breath.    He has severe obstructive sleep apnea.  He has an inspire implant but he has not thought that it has worked as well as he thought it would.  He still using his old CPAP machine.  Inspire is still working when needed.  He denies significant daytime sleepiness.  No increasing shortness of breath or palpitations.  No history of arrhythmia.    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    On rosuvastatin 5 mg.  2018 cardiac CT scan calcium score of 44 but has not had an event.  No chest pain increasing shortness of breath.    Has never smoked.    Normal kidney labs October 2024.    History of basal cell and melanoma.  He wants to see a dermatologist for a 6-month check year but was seen by dermatology last fall in [place].    Patient had significant increased urgency and urinary frequency this past winter.  [place]  He went and saw urology and had full evaluation including cystoscopy which showed an obstructed bladder but otherwise negative cystoscopy.  He was given Flomax but has not improved things too much.  He got some more heartburn.  He is just use that for the past week and a half.  Still 3 times a night nocturia.  He denies dysuria or gross hematuria.    PMHx:    Past Medical History:   Diagnosis Date    BCC (basal cell carcinoma), face     Colonic fistula  "    Gastroesophageal reflux disease without esophagitis     Hypercholesterolemia     Other male erectile dysfunction     Schwannoma - Base of throat/tongue     Sleep apnea      PSHx:    Past Surgical History:   Procedure Laterality Date    Colon Surgery due to Fistula      Left Knee Arthroscopic Surgery Left     Schwannoma Removal      Inferior aspect of the tongue    SKIN CANCER EXCISION Left     To remove basal cell CA from left lower eyelid     Immunizations:   Immunization History   Administered Date(s) Administered    COVID-19 12+ (MODERNA) 10/18/2023    COVID-19 Bivalent 18+ (Moderna) 09/26/2022    COVID-19 MONOVALENT 12+ (Pfizer) 10/04/2021    COVID-19 Monovalent 18+ (Moderna) 04/18/2022    Flu, Unspecified 09/09/2020    Influenza (High Dose) Trivalent,PF (Fluzone) 10/05/2013, 09/14/2017, 09/11/2018, 08/30/2019, 09/19/2022, 10/07/2024    Influenza (IIV3) PF 09/20/2012, 10/22/2014    Influenza (prior to 2024) 09/23/2016    Influenza Vaccine 65+ (FLUAD) 09/26/2023    Influenza Vaccine 65+ (Fluzone HD) 09/09/2021, 09/18/2022, 09/26/2023    Influenza Vaccine >6 months,quad, PF 09/20/2012    Influenza Vaccine, 6+MO IM (QUADRIVALENT W/PRESERVATIVES) 09/20/2012    Pneumo Conj 13-V (2010&after) 06/08/2015    Pneumococcal 23 valent 06/02/2014    RSV Vaccine (Arexvy) 10/04/2023    TDAP (Adacel,Boostrix) 06/30/2016    Zoster recombinant adjuvanted (Shingrix) 01/02/2020, 05/26/2020    Zoster vaccine, live 05/07/2012       ROS A comprehensive review of systems was performed and was otherwise negative    Medications, allergies, and problem list were reviewed and updated    Exam  /80   Pulse 56   Temp 98  F (36.7  C)   Resp 18   Ht 1.75 m (5' 8.9\")   Wt 86.6 kg (191 lb)   SpO2 97%   BMI 28.29 kg/m    Alert and oriented x 3.  Gait normal.  Lungs clear.  Heart is regular without murmur.  Abdomen is nontender and no edema.    Assessment/Plan  1. Essential hypertension (Primary)  Well-controlled.  Continue " amlodipine 2.5 mg a day.  No longer on the long-acting propranolol.    2. SHA (obstructive sleep apnea)  Controlled with CPAP.  Does have the inspire that he can use as well.  Weight is gone up some but he feels his sleep apnea is well-controlled.    3. Essential tremor  Controlled on just the short acting propranolol  - propranolol (INDERAL) 10 MG tablet; Take 1 to 2 tablets a day as needed for essential tremor    4. Hypercholesterolemia  Has been well-controlled.  Continue current rosuvastatin.  Check labs and follow at his physical  - rosuvastatin (CRESTOR) 5 MG tablet; Take 1 tablet (5 mg) by mouth daily.  Dispense: 90 tablet; Refill: 3    5. History of colonic polyps  5 mm polyp September 2024.  5-year follow-up depending on condition in 4 years    6. History of skin cancer  History of basal cell and melanoma.  He has a card for dermatology consultants, he was told to make an appointment.  He was offered referral to [facility] if he wishes     7. Benign prostatic hyperplasia with urinary frequency  Significant increased urinary frequency.  Not much better despite the Flomax.  I discussed finasteride, but discussed sexual side effects and he declined.    He denies UTI symptoms.  Refer to urology to consider additional intervention for his urinary symptoms.  May need to consider a TURP  - Adult Urology  Referral; Future    8. Need for vaccination against Streptococcus pneumoniae  Given today.  - PNEUMOCOCCAL 20 VALENT CONJUGATE (PREVNAR 20)    9. Benign essential hypertension  Refill given as above  - amLODIPine (NORVASC) 2.5 MG tablet; Take 1 tablet (2.5 mg) by mouth daily.  Dispense: 90 tablet; Refill: 3    Heartburn he can use Tums or Pepcid.  He was warned the Pepcid may affect his urination some.  Also use omeprazole daily if that is more of an issue.    Neck DJD controlled, continue range of motion exercises daily and massage.    The longitudinal plan of care for the  diagnosis(es)/condition(s) as documented were addressed during this visit. Due to the added complexity in care, I will continue to support Niranjan in the subsequent management and with ongoing continuity of care.        Return in about 5 months (around 10/13/2025) for Annual wellness visit.   Patient Instructions   Continue current amlodipine.  Goal blood pressure less than 135/85 but not less than 110/60.    Continue to walk and stay active on a daily basis.    Continue to use your CPAP or inspire.    See [facility], Dr. Best Syed or partner, for your prostate issue.  Continue taking the tamsulosin every evening.    See dermatology consultants for your 6-month skin exam.    You can use Tums or famotidine for heartburn at night.  Tamsulosin can worsen heartburn.    See me after October 11 for your annual wellness visit.    Herberth Duffy MD, MD        Current Outpatient Medications   Medication Sig Dispense Refill    acetaminophen (TYLENOL 8 HOUR ARTHRITIS PAIN) 650 MG CR tablet Take 650 mg by mouth every 8 hours as needed for mild pain or fever      amLODIPine (NORVASC) 2.5 MG tablet Take 1 tablet (2.5 mg) by mouth daily. 90 tablet 3    naproxen sodium 220 MG capsule Take 220 mg by mouth 2 times daily (with meals)      propranolol (INDERAL) 10 MG tablet Take 1 to 2 tablets a day as needed for essential tremor      rosuvastatin (CRESTOR) 5 MG tablet Take 1 tablet (5 mg) by mouth daily. 90 tablet 3    tadalafil (CIALIS) 10 MG tablet Take 1 tablet (10 mg) by mouth daily as needed (Erectile dysfunction) 20 tablet 5    tamsulosin (FLOMAX) 0.4 MG capsule Take 0.4 mg by mouth daily.      triamcinolone (KENALOG) 0.1 % external cream APPLY A THIN LAYER TO THE AFFECTED AREA(S) AT THE LEGS TWICE A DAY, AS NEEDED, FOR ITCH       Allergies   Allergen Reactions    Strawberry Flavoring Agent (Non-Screening) Hives     Social History     Tobacco Use    Smoking status: Never     Passive exposure: Never    Smokeless tobacco:  "Never   Vaping Use    Vaping status: Never Used   Substance Use Topics    Alcohol use: Yes     Alcohol/week: 14.0 standard drinks of alcohol     Types: 14 Shots of liquor per week     Comment: Drinks \"2 scotch per day.\" His partner said he has 3 per day.    Drug use: Never             Subjective   Niranjan is a 80 year old, presenting for the following health issues:  Follow Up (BP check.)        5/21/2025    12:44 PM   Additional Questions   Roomed by Divya PRIETO   Accompanied by qasim     History of Present Illness       Reason for visit:  3 issues  Symptom onset:  More than a month  Had these symptoms before:  Yes   He is taking medications regularly.                      Objective    /80   Pulse 56   Temp 98  F (36.7  C)   Resp 18   Ht 1.75 m (5' 8.9\")   Wt 86.6 kg (191 lb)   SpO2 97%   BMI 28.29 kg/m    Body mass index is 28.29 kg/m .  Physical Exam               Signed Electronically by: Herberth Duffy MD    "

## 2025-05-21 NOTE — PATIENT INSTRUCTIONS
Continue current amlodipine.  Goal blood pressure less than 135/85 but not less than 110/60.    Continue to walk and stay active on a daily basis.    Continue to use your CPAP or inspire.    See [facility], Dr. Best Syed or partner, for your prostate issue.  Continue taking the tamsulosin every evening.    See dermatology consultants for your 6-month skin exam.    You can use Tums or famotidine for heartburn at night.  Tamsulosin can worsen heartburn.    See me after October 11 for your annual wellness visit.

## 2025-06-27 ENCOUNTER — MEDICAL CORRESPONDENCE (OUTPATIENT)
Dept: HEALTH INFORMATION MANAGEMENT | Facility: CLINIC | Age: 80
End: 2025-06-27
Payer: MEDICARE

## 2025-07-02 ENCOUNTER — MEDICAL CORRESPONDENCE (OUTPATIENT)
Dept: HEALTH INFORMATION MANAGEMENT | Facility: CLINIC | Age: 80
End: 2025-07-02
Payer: MEDICARE

## 2025-07-30 ENCOUNTER — TELEPHONE (OUTPATIENT)
Dept: INTERNAL MEDICINE | Facility: CLINIC | Age: 80
End: 2025-07-30
Payer: MEDICARE
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
## 2025-07-31 NOTE — TELEPHONE ENCOUNTER
Outgoing call to patient's daughter jerry GILMAR on file, who states she has noticed a cognitive decline in pt and her step mom confirmed pt has been acting different.     Daughter would like to know how they can get a cognitive/memory care referral placed WITHOUT coming in for a visit. Writer informed Daughter, pt can fill out an EVISIT on [employer] for referral request.     Evisit instructions sent via [employer]. Alonso states she will fill out EVISIT with pt tomorrow. NO further questions or concerns at this time. Thank you.

## 2025-08-04 ENCOUNTER — OFFICE VISIT (OUTPATIENT)
Dept: INTERNAL MEDICINE | Facility: CLINIC | Age: 80
End: 2025-08-04
Payer: MEDICARE

## 2025-08-04 VITALS
BODY MASS INDEX: 28.29 KG/M2 | TEMPERATURE: 98 F | HEIGHT: 69 IN | RESPIRATION RATE: 16 BRPM | OXYGEN SATURATION: 98 % | DIASTOLIC BLOOD PRESSURE: 69 MMHG | HEART RATE: 86 BPM | SYSTOLIC BLOOD PRESSURE: 101 MMHG

## 2025-08-04 DIAGNOSIS — Z86.711 HISTORY OF PULMONARY EMBOLISM: ICD-10-CM

## 2025-08-04 DIAGNOSIS — R41.3 MEMORY LOSS: Primary | ICD-10-CM

## 2025-08-04 DIAGNOSIS — G47.33 OBSTRUCTIVE SLEEP APNEA ON CPAP: ICD-10-CM

## 2025-08-04 DIAGNOSIS — C79.31 MALIGNANT MELANOMA METASTATIC TO BRAIN (H): ICD-10-CM

## 2025-08-04 DIAGNOSIS — Z95.828 S/P IVC FILTER: ICD-10-CM

## 2025-08-04 PROCEDURE — 3074F SYST BP LT 130 MM HG: CPT | Performed by: INTERNAL MEDICINE

## 2025-08-04 PROCEDURE — G2211 COMPLEX E/M VISIT ADD ON: HCPCS | Performed by: INTERNAL MEDICINE

## 2025-08-04 PROCEDURE — 3078F DIAST BP <80 MM HG: CPT | Performed by: INTERNAL MEDICINE

## 2025-08-04 PROCEDURE — 99214 OFFICE O/P EST MOD 30 MIN: CPT | Performed by: INTERNAL MEDICINE

## 2025-08-04 RX ORDER — ENOXAPARIN SODIUM 100 MG/ML
60 INJECTION SUBCUTANEOUS EVERY 12 HOURS
COMMUNITY
Start: 2025-07-16

## 2025-08-21 ENCOUNTER — DOCUMENTATION ONLY (OUTPATIENT)
Dept: OTHER | Facility: CLINIC | Age: 80
End: 2025-08-21
Payer: MEDICARE

## 2025-08-27 ENCOUNTER — TELEPHONE (OUTPATIENT)
Dept: INTERNAL MEDICINE | Facility: CLINIC | Age: 80
End: 2025-08-27
Payer: MEDICARE